# Patient Record
Sex: FEMALE | Race: BLACK OR AFRICAN AMERICAN | Employment: OTHER | URBAN - METROPOLITAN AREA
[De-identification: names, ages, dates, MRNs, and addresses within clinical notes are randomized per-mention and may not be internally consistent; named-entity substitution may affect disease eponyms.]

---

## 2017-05-01 ENCOUNTER — OFFICE VISIT (OUTPATIENT)
Dept: FAMILY MEDICINE CLINIC | Age: 82
End: 2017-05-01

## 2017-05-01 VITALS
HEIGHT: 64 IN | TEMPERATURE: 97.3 F | DIASTOLIC BLOOD PRESSURE: 84 MMHG | BODY MASS INDEX: 21.15 KG/M2 | SYSTOLIC BLOOD PRESSURE: 148 MMHG | OXYGEN SATURATION: 99 % | WEIGHT: 123.9 LBS | HEART RATE: 88 BPM | RESPIRATION RATE: 16 BRPM

## 2017-05-01 DIAGNOSIS — H91.91 HEARING LOSS, RIGHT: Primary | ICD-10-CM

## 2017-05-01 DIAGNOSIS — H61.21 EXCESSIVE EAR WAX, RIGHT: ICD-10-CM

## 2017-05-01 DIAGNOSIS — R03.0 HIGH BLOOD PRESSURE (NOT HYPERTENSION): ICD-10-CM

## 2017-05-01 PROBLEM — H61.20 EXCESSIVE EAR WAX: Status: ACTIVE | Noted: 2017-05-01

## 2017-05-01 PROCEDURE — 99213 OFFICE O/P EST LOW 20 MIN: CPT | Performed by: FAMILY MEDICINE

## 2017-05-01 RX ORDER — BRIMONIDINE TARTRATE, TIMOLOL MALEATE 2; 5 MG/ML; MG/ML
SOLUTION/ DROPS OPHTHALMIC EVERY 12 HOURS
COMMUNITY
End: 2017-05-30 | Stop reason: ALTCHOICE

## 2017-05-01 ASSESSMENT — ENCOUNTER SYMPTOMS
SWOLLEN GLANDS: 0
NAUSEA: 0
SORE THROAT: 0
COUGH: 0
VISUAL CHANGE: 0
CHANGE IN BOWEL HABIT: 0

## 2017-05-04 ENCOUNTER — OFFICE VISIT (OUTPATIENT)
Dept: ENT CLINIC | Age: 82
End: 2017-05-04

## 2017-05-04 VITALS
SYSTOLIC BLOOD PRESSURE: 146 MMHG | BODY MASS INDEX: 21 KG/M2 | WEIGHT: 123 LBS | DIASTOLIC BLOOD PRESSURE: 80 MMHG | HEIGHT: 64 IN

## 2017-05-04 DIAGNOSIS — H61.23 EXCESSIVE EAR WAX, BILATERAL: Primary | ICD-10-CM

## 2017-05-04 PROCEDURE — 99202 OFFICE O/P NEW SF 15 MIN: CPT | Performed by: OTOLARYNGOLOGY

## 2017-05-17 ENCOUNTER — TELEPHONE (OUTPATIENT)
Dept: FAMILY MEDICINE CLINIC | Age: 82
End: 2017-05-17

## 2017-05-30 ENCOUNTER — OFFICE VISIT (OUTPATIENT)
Dept: FAMILY MEDICINE CLINIC | Age: 82
End: 2017-05-30

## 2017-05-30 VITALS
WEIGHT: 123 LBS | BODY MASS INDEX: 21 KG/M2 | RESPIRATION RATE: 16 BRPM | DIASTOLIC BLOOD PRESSURE: 76 MMHG | SYSTOLIC BLOOD PRESSURE: 138 MMHG | OXYGEN SATURATION: 98 % | HEIGHT: 64 IN | TEMPERATURE: 97.8 F | HEART RATE: 101 BPM

## 2017-05-30 DIAGNOSIS — H40.9 GLAUCOMA OF LEFT EYE, UNSPECIFIED GLAUCOMA: ICD-10-CM

## 2017-05-30 DIAGNOSIS — R03.0 HIGH BLOOD PRESSURE (NOT HYPERTENSION): ICD-10-CM

## 2017-05-30 DIAGNOSIS — Z01.818 PREOP EXAMINATION: Primary | ICD-10-CM

## 2017-05-30 PROCEDURE — 99214 OFFICE O/P EST MOD 30 MIN: CPT | Performed by: FAMILY MEDICINE

## 2017-05-30 ASSESSMENT — ENCOUNTER SYMPTOMS
DIARRHEA: 0
CHEST TIGHTNESS: 0
WHEEZING: 0
NAUSEA: 0
SHORTNESS OF BREATH: 0

## 2017-06-06 ENCOUNTER — TELEPHONE (OUTPATIENT)
Dept: FAMILY MEDICINE CLINIC | Age: 82
End: 2017-06-06

## 2017-07-10 ENCOUNTER — TELEPHONE (OUTPATIENT)
Dept: FAMILY MEDICINE CLINIC | Age: 82
End: 2017-07-10

## 2017-07-10 DIAGNOSIS — G30.9 ALZHEIMER'S DEMENTIA WITHOUT BEHAVIORAL DISTURBANCE, UNSPECIFIED TIMING OF DEMENTIA ONSET: Primary | ICD-10-CM

## 2017-07-10 DIAGNOSIS — F02.80 ALZHEIMER'S DEMENTIA WITHOUT BEHAVIORAL DISTURBANCE, UNSPECIFIED TIMING OF DEMENTIA ONSET: Primary | ICD-10-CM

## 2017-09-06 ENCOUNTER — TELEPHONE (OUTPATIENT)
Dept: FAMILY MEDICINE CLINIC | Age: 82
End: 2017-09-06

## 2017-10-05 ENCOUNTER — OFFICE VISIT (OUTPATIENT)
Dept: FAMILY MEDICINE CLINIC | Age: 82
End: 2017-10-05

## 2017-10-05 VITALS
TEMPERATURE: 97.9 F | SYSTOLIC BLOOD PRESSURE: 132 MMHG | DIASTOLIC BLOOD PRESSURE: 72 MMHG | WEIGHT: 124.7 LBS | OXYGEN SATURATION: 97 % | HEART RATE: 97 BPM | BODY MASS INDEX: 21.4 KG/M2 | RESPIRATION RATE: 16 BRPM

## 2017-10-05 DIAGNOSIS — R35.0 URINARY FREQUENCY: ICD-10-CM

## 2017-10-05 DIAGNOSIS — G31.09 FRONTO-TEMPORAL DEMENTIA (HCC): Primary | ICD-10-CM

## 2017-10-05 DIAGNOSIS — F02.80 FRONTO-TEMPORAL DEMENTIA (HCC): Primary | ICD-10-CM

## 2017-10-05 DIAGNOSIS — F41.8 DEPRESSION WITH ANXIETY: ICD-10-CM

## 2017-10-05 LAB
BILIRUBIN, POC: ABNORMAL
BLOOD URINE, POC: NEGATIVE
CLARITY, POC: CLEAR
COLOR, POC: YELLOW
GLUCOSE URINE, POC: ABNORMAL
KETONES, POC: NEGATIVE
LEUKOCYTE EST, POC: ABNORMAL
NITRITE, POC: NEGATIVE
PH, POC: 7.5
PROTEIN, POC: NEGATIVE
SPECIFIC GRAVITY, POC: 1.01
UROBILINOGEN, POC: 0.2

## 2017-10-05 PROCEDURE — 99214 OFFICE O/P EST MOD 30 MIN: CPT | Performed by: FAMILY MEDICINE

## 2017-10-05 PROCEDURE — 81002 URINALYSIS NONAUTO W/O SCOPE: CPT | Performed by: FAMILY MEDICINE

## 2017-10-05 RX ORDER — PAROXETINE 10 MG/1
10 TABLET, FILM COATED ORAL EVERY MORNING
Qty: 30 TABLET | Refills: 3 | Status: ON HOLD | OUTPATIENT
Start: 2017-10-05 | End: 2019-04-06 | Stop reason: HOSPADM

## 2017-10-05 ASSESSMENT — ENCOUNTER SYMPTOMS
CHANGE IN BOWEL HABIT: 0
VOMITING: 0
ABDOMINAL PAIN: 0
COUGH: 0
SORE THROAT: 0
VISUAL CHANGE: 0
NAUSEA: 0
SWOLLEN GLANDS: 0

## 2017-10-05 NOTE — PROGRESS NOTES
Subjective:      Patient ID: Ryan Kaur is a 80 y.o. female. Other   This is a new (dx with dementia\") problem. The current episode started more than 1 year ago. The problem occurs constantly. The problem has been gradually worsening. Associated symptoms include fatigue, urinary symptoms (frequency \"uses panty liners\") and weakness. Pertinent negatives include no abdominal pain, anorexia, arthralgias, change in bowel habit, chest pain, chills, congestion, coughing, fever, headaches, joint swelling, myalgias, nausea, neck pain, numbness, rash, sore throat, swollen glands, vertigo, visual change or vomiting. Nothing aggravates the symptoms. She has tried nothing for the symptoms. Mental Health Problem   The primary symptoms include dysphoric mood, negative symptoms and somatic symptoms. The primary symptoms do not include delusions, hallucinations, bizarre behavior or disorganized speech. Primary symptoms comment: anxious/depressed, son is an alcoholic \"always worried for him\". The current episode started more than 1 month ago. This is a chronic problem. The dysphoric mood began more than 2 weeks ago. The mood has been worsening since its onset. She characterizes the problem as severe. The mood includes feelings of sadness. Somatic symptoms include fatigue. Somatic symptoms do not include headaches, abdominal pain or myalgias. The degree of incapacity that she is experiencing as a consequence of her illness is moderate. Additional symptoms of the illness include anhedonia, insomnia, appetite change, fatigue, psychomotor retardation, attention impairment and poor judgment.  Additional symptoms of the illness do not include no hypersomnia, no unexpected weight change, no agitation, no feelings of worthlessness, no euphoric mood, no increased goal-directed activity, no flight of ideas, no inflated self-esteem, no decreased need for sleep, not distractible, no visual change, no headaches or no abdominal pain. She does not admit to suicidal ideas. She does not have a plan to commit suicide. She does not contemplate harming herself. She has not already injured self. She does not contemplate injuring another person. She has not already  injured another person. Risk factors that are present for mental illness include a history of mental illness. Review of Systems   Constitutional: Positive for appetite change and fatigue. Negative for chills, fever and unexpected weight change. HENT: Negative for congestion and sore throat. Respiratory: Negative for cough. Cardiovascular: Negative for chest pain. Gastrointestinal: Negative for abdominal pain, anorexia, change in bowel habit, nausea and vomiting. Musculoskeletal: Negative for arthralgias, joint swelling, myalgias and neck pain. Skin: Negative for rash. Neurological: Positive for weakness. Negative for vertigo, numbness and headaches. Psychiatric/Behavioral: Positive for dysphoric mood. Negative for agitation and hallucinations. The patient has insomnia. has No Known Allergies. Current Outpatient Prescriptions:     PARoxetine (PAXIL) 10 MG tablet, Take 1 tablet by mouth every morning, Disp: 30 tablet, Rfl: 3    latanoprost (XALATAN) 0.005 % ophthalmic solution, 1 drop nightly, Disp: , Rfl:     vitamin B-12 (CYANOCOBALAMIN) 1000 MCG tablet, Take 1,000 mcg by mouth daily, Disp: , Rfl:     ferrous sulfate 325 (65 FE) MG tablet, Take 325 mg by mouth daily (with breakfast), Disp: , Rfl:     Carboxymethylcellul-Glycerin (OPTIVE OP), Apply to eye, Disp: , Rfl:     desloratadine (CLARINEX) 5 MG tablet, One po qd, Disp: 30 tablet, Rfl: 0    Multiple Minerals-Vitamins (CITRACAL PLUS PO), Take 1 tablet by mouth 2 times daily, Disp: , Rfl:     Multiple Vitamins-Minerals (MULTIVITAL) TABS, Take 1 tablet by mouth 2 times daily, Disp: , Rfl:      has a past medical history of Allergic rhinitis; Anemia;  Anxious mood; Depression; and Glaucoma. Past Surgical History:   Procedure Laterality Date    CATARACT REMOVAL  12/14    Glaucoma surgery as well    CATARACT REMOVAL WITH IMPLANT  4/2015    Glaucoma surgery as well    COLONOSCOPY      Unknown date    NOSE SURGERY          reports that she has never smoked. She has never used smokeless tobacco. She reports that she does not drink alcohol or use illicit drugs. family history includes Breast Cancer in her sister; Cancer in her brother; Dementia in her mother. Objective:   Physical Exam   Constitutional: She is oriented to person, place, and time. She appears well-developed and well-nourished. No distress. HENT:   Head: Normocephalic. Eyes: Conjunctivae and EOM are normal. Pupils are equal, round, and reactive to light. No scleral icterus. Neck: Normal range of motion. Cardiovascular: Normal rate and regular rhythm. No murmur heard. Pulmonary/Chest: Effort normal and breath sounds normal. No respiratory distress. She has no wheezes. Musculoskeletal: Normal range of motion. Neurological: She is alert and oriented to person, place, and time. No cranial nerve deficit. Coordination normal.   Skin: Skin is warm. She is not diaphoretic. No erythema. Psychiatric: Her mood appears anxious. Her affect is not angry, not blunt, not labile and not inappropriate. Her speech is tangential. She is slowed. She is not agitated, not aggressive, not withdrawn and not actively hallucinating. Thought content is paranoid (thinks that eye drops given by ophthalmology \"caused all my problems\", ). Thought content is not delusional. She does not express impulsivity or inappropriate judgment. She exhibits a depressed mood. She expresses no homicidal and no suicidal ideation. She expresses no suicidal plans and no homicidal plans. She exhibits normal recent memory. She is attentive. Nursing note and vitals reviewed. Assessment:      1. Fronto-temporal dementia     2.  Depression

## 2017-10-07 LAB — URINE CULTURE, ROUTINE: NORMAL

## 2017-12-15 ENCOUNTER — TELEPHONE (OUTPATIENT)
Dept: FAMILY MEDICINE CLINIC | Age: 82
End: 2017-12-15

## 2017-12-15 NOTE — TELEPHONE ENCOUNTER
There is no serious interactions with eye drops  Continue same medications no changes needed at this time

## 2017-12-15 NOTE — TELEPHONE ENCOUNTER
[phone]   Liza Cunha    PT getting a tingling on her shoulder, sometimes on her back. Thinks it could be interaction between her meds from here with medication she got from her opthamogologist.  She thin\ks that medication from him is xalatan-latanoprost.    PT got this idea from watching television commercials. PT last seen 10/5/2017    Please advise patient.

## 2018-01-15 ENCOUNTER — OFFICE VISIT (OUTPATIENT)
Dept: FAMILY MEDICINE CLINIC | Age: 83
End: 2018-01-15

## 2018-01-15 VITALS
SYSTOLIC BLOOD PRESSURE: 136 MMHG | OXYGEN SATURATION: 97 % | DIASTOLIC BLOOD PRESSURE: 76 MMHG | RESPIRATION RATE: 16 BRPM | TEMPERATURE: 97.5 F | HEART RATE: 73 BPM | BODY MASS INDEX: 20.49 KG/M2 | HEIGHT: 64 IN | WEIGHT: 120 LBS

## 2018-01-15 DIAGNOSIS — F02.80 FRONTAL DEMENTIA (HCC): Primary | ICD-10-CM

## 2018-01-15 DIAGNOSIS — G31.09 FRONTAL DEMENTIA (HCC): Primary | ICD-10-CM

## 2018-01-15 DIAGNOSIS — F41.8 DEPRESSION WITH ANXIETY: ICD-10-CM

## 2018-01-15 PROCEDURE — 99214 OFFICE O/P EST MOD 30 MIN: CPT | Performed by: FAMILY MEDICINE

## 2018-01-15 ASSESSMENT — ENCOUNTER SYMPTOMS: VISUAL CHANGE: 0

## 2018-01-15 NOTE — PROGRESS NOTES
well-nourished. No distress. HENT:   Head: Normocephalic and atraumatic. Eyes: Conjunctivae and EOM are normal. Pupils are equal, round, and reactive to light. Neck: Normal range of motion. Neck supple. No thyromegaly present. Cardiovascular: Normal rate, regular rhythm, normal heart sounds and intact distal pulses. Exam reveals no gallop and no friction rub. No murmur heard. Musculoskeletal: She exhibits no edema. Neurological: She is alert and oriented to person, place, and time. She displays normal reflexes. No cranial nerve deficit. She exhibits normal muscle tone. Coordination normal.   Skin: Skin is warm. She is not diaphoretic. No erythema. Psychiatric: Judgment and thought content normal. Her mood appears not anxious. Her affect is not angry, not blunt, not labile and not inappropriate. Her speech is tangential. Her speech is not delayed. She is slowed. She is not agitated. Thought content is not paranoid and not delusional. Cognition and memory are impaired. She does not express impulsivity or inappropriate judgment. She does not exhibit a depressed mood. She expresses no homicidal and no suicidal ideation. She expresses no suicidal plans and no homicidal plans. Nursing note and vitals reviewed. Assessment:      1. Frontal dementia     2. Depression with anxiety             Plan:      1. Unchanged  Life style and safety discussed  There is stressors in the family, her daughter who is the primary care is under a lot of stress, we discuss the option of going to a NH but pt is not ready for this, his safety seems to be an issue, her daughter thinks that she is not safe at home. Her daughter left the room very mad with her mother.      We will keep an eye on this, but agree with daughter that Mrs Dayanna Miranda may need to go to NH    She agreed to take paxil as rx      Time face to face >25 minutes, 50% time spent in education and coordination of care  Topics discussed:   Medical condition,

## 2018-02-27 ENCOUNTER — TELEPHONE (OUTPATIENT)
Dept: FAMILY MEDICINE CLINIC | Age: 83
End: 2018-02-27

## 2018-03-02 ENCOUNTER — TELEPHONE (OUTPATIENT)
Dept: FAMILY MEDICINE CLINIC | Age: 83
End: 2018-03-02

## 2018-03-02 NOTE — TELEPHONE ENCOUNTER
Pt daughter is calling they are going to be looking in to a memory loss treatment center. They have 3 they are going to try and look at today. She said that her mom was not very happy.      Daughter said if you have any questions to please call her    Please advise   [phone]

## 2018-03-05 ENCOUNTER — TELEPHONE (OUTPATIENT)
Dept: FAMILY MEDICINE CLINIC | Age: 83
End: 2018-03-05

## 2018-03-26 ENCOUNTER — TELEPHONE (OUTPATIENT)
Dept: FAMILY MEDICINE CLINIC | Age: 83
End: 2018-03-26

## 2018-04-03 ENCOUNTER — TELEPHONE (OUTPATIENT)
Dept: FAMILY MEDICINE CLINIC | Age: 83
End: 2018-04-03

## 2018-04-16 ENCOUNTER — OFFICE VISIT (OUTPATIENT)
Dept: FAMILY MEDICINE CLINIC | Age: 83
End: 2018-04-16

## 2018-04-16 VITALS
TEMPERATURE: 97.5 F | BODY MASS INDEX: 20.83 KG/M2 | OXYGEN SATURATION: 98 % | HEART RATE: 82 BPM | DIASTOLIC BLOOD PRESSURE: 72 MMHG | HEIGHT: 64 IN | RESPIRATION RATE: 16 BRPM | WEIGHT: 122 LBS | SYSTOLIC BLOOD PRESSURE: 140 MMHG

## 2018-04-16 DIAGNOSIS — F02.80 FRONTAL DEMENTIA (HCC): ICD-10-CM

## 2018-04-16 DIAGNOSIS — G31.09 FRONTAL DEMENTIA (HCC): ICD-10-CM

## 2018-04-16 DIAGNOSIS — I10 ESSENTIAL HYPERTENSION: Primary | ICD-10-CM

## 2018-04-16 PROCEDURE — 99214 OFFICE O/P EST MOD 30 MIN: CPT | Performed by: FAMILY MEDICINE

## 2018-04-16 ASSESSMENT — ENCOUNTER SYMPTOMS
ORTHOPNEA: 0
SHORTNESS OF BREATH: 0
BLURRED VISION: 0

## 2018-05-07 ENCOUNTER — TELEPHONE (OUTPATIENT)
Dept: FAMILY MEDICINE CLINIC | Age: 83
End: 2018-05-07

## 2018-05-16 ENCOUNTER — OFFICE VISIT (OUTPATIENT)
Dept: FAMILY MEDICINE CLINIC | Age: 83
End: 2018-05-16

## 2018-05-16 VITALS
TEMPERATURE: 97.8 F | RESPIRATION RATE: 16 BRPM | WEIGHT: 120 LBS | OXYGEN SATURATION: 98 % | DIASTOLIC BLOOD PRESSURE: 82 MMHG | BODY MASS INDEX: 20.49 KG/M2 | SYSTOLIC BLOOD PRESSURE: 130 MMHG | HEIGHT: 64 IN | HEART RATE: 77 BPM

## 2018-05-16 DIAGNOSIS — Z00.00 MEDICARE ANNUAL WELLNESS VISIT, SUBSEQUENT: Primary | ICD-10-CM

## 2018-05-16 LAB
ANION GAP SERPL CALCULATED.3IONS-SCNC: 13 MMOL/L (ref 3–16)
BUN BLDV-MCNC: 19 MG/DL (ref 7–20)
CALCIUM SERPL-MCNC: 10.2 MG/DL (ref 8.3–10.6)
CHLORIDE BLD-SCNC: 107 MMOL/L (ref 99–110)
CHOLESTEROL, TOTAL: 181 MG/DL (ref 0–199)
CO2: 26 MMOL/L (ref 21–32)
CREAT SERPL-MCNC: 0.8 MG/DL (ref 0.6–1.2)
GFR AFRICAN AMERICAN: >60
GFR NON-AFRICAN AMERICAN: >60
GLUCOSE BLD-MCNC: 91 MG/DL (ref 70–99)
HDLC SERPL-MCNC: 62 MG/DL (ref 40–60)
LDL CHOLESTEROL CALCULATED: 107 MG/DL
POTASSIUM SERPL-SCNC: 4.5 MMOL/L (ref 3.5–5.1)
SODIUM BLD-SCNC: 146 MMOL/L (ref 136–145)
TRIGL SERPL-MCNC: 62 MG/DL (ref 0–150)
TSH SERPL DL<=0.05 MIU/L-ACNC: 3.14 UIU/ML (ref 0.27–4.2)
VLDLC SERPL CALC-MCNC: 12 MG/DL

## 2018-05-16 PROCEDURE — G0439 PPPS, SUBSEQ VISIT: HCPCS | Performed by: FAMILY MEDICINE

## 2018-05-16 ASSESSMENT — PATIENT HEALTH QUESTIONNAIRE - PHQ9
2. FEELING DOWN, DEPRESSED OR HOPELESS: 0
SUM OF ALL RESPONSES TO PHQ QUESTIONS 1-9: 0
1. LITTLE INTEREST OR PLEASURE IN DOING THINGS: 0
SUM OF ALL RESPONSES TO PHQ9 QUESTIONS 1 & 2: 0

## 2018-09-18 ENCOUNTER — TELEPHONE (OUTPATIENT)
Dept: FAMILY MEDICINE CLINIC | Age: 83
End: 2018-09-18

## 2018-12-27 ENCOUNTER — TELEPHONE (OUTPATIENT)
Dept: FAMILY MEDICINE CLINIC | Age: 83
End: 2018-12-27

## 2019-03-20 ENCOUNTER — HOSPITAL ENCOUNTER (EMERGENCY)
Age: 84
Discharge: HOME | End: 2019-03-21
Attending: EMERGENCY MEDICINE
Payer: MEDICARE

## 2019-03-20 ENCOUNTER — APPOINTMENT (OUTPATIENT)
Dept: GENERAL RADIOLOGY | Age: 84
End: 2019-03-20
Payer: MEDICARE

## 2019-03-20 DIAGNOSIS — R07.81 RIB PAIN: ICD-10-CM

## 2019-03-20 DIAGNOSIS — F03.90 DEMENTIA WITHOUT BEHAVIORAL DISTURBANCE, UNSPECIFIED DEMENTIA TYPE: ICD-10-CM

## 2019-03-20 DIAGNOSIS — E87.8 ELECTROLYTE IMBALANCE: Primary | ICD-10-CM

## 2019-03-20 LAB
A/G RATIO: 1.1 (ref 1.1–2.2)
ALBUMIN SERPL-MCNC: 2.8 G/DL (ref 3.4–5)
ALP BLD-CCNC: 50 U/L (ref 40–129)
ALT SERPL-CCNC: 9 U/L (ref 10–40)
ANION GAP SERPL CALCULATED.3IONS-SCNC: 8 MMOL/L (ref 3–16)
AST SERPL-CCNC: 15 U/L (ref 15–37)
BASOPHILS ABSOLUTE: 0.1 K/UL (ref 0–0.2)
BASOPHILS RELATIVE PERCENT: 1.3 %
BILIRUB SERPL-MCNC: 0.4 MG/DL (ref 0–1)
BILIRUBIN URINE: NEGATIVE
BLOOD, URINE: NEGATIVE
BUN BLDV-MCNC: 10 MG/DL (ref 7–20)
CALCIUM SERPL-MCNC: 7.5 MG/DL (ref 8.3–10.6)
CHLORIDE BLD-SCNC: 113 MMOL/L (ref 99–110)
CLARITY: CLEAR
CO2: 22 MMOL/L (ref 21–32)
COLOR: YELLOW
CREAT SERPL-MCNC: <0.5 MG/DL (ref 0.6–1.2)
EOSINOPHILS ABSOLUTE: 0.1 K/UL (ref 0–0.6)
EOSINOPHILS RELATIVE PERCENT: 1.5 %
GFR AFRICAN AMERICAN: >60
GFR NON-AFRICAN AMERICAN: >60
GLOBULIN: 2.6 G/DL
GLUCOSE BLD-MCNC: 71 MG/DL (ref 70–99)
GLUCOSE URINE: NEGATIVE MG/DL
HCT VFR BLD CALC: 41.2 % (ref 36–48)
HEMOGLOBIN: 13.4 G/DL (ref 12–16)
KETONES, URINE: NEGATIVE MG/DL
LEUKOCYTE ESTERASE, URINE: NEGATIVE
LYMPHOCYTES ABSOLUTE: 1.4 K/UL (ref 1–5.1)
LYMPHOCYTES RELATIVE PERCENT: 31.5 %
MAGNESIUM: 1.6 MG/DL (ref 1.8–2.4)
MCH RBC QN AUTO: 31.1 PG (ref 26–34)
MCHC RBC AUTO-ENTMCNC: 32.4 G/DL (ref 31–36)
MCV RBC AUTO: 95.8 FL (ref 80–100)
MICROSCOPIC EXAMINATION: NORMAL
MONOCYTES ABSOLUTE: 0.6 K/UL (ref 0–1.3)
MONOCYTES RELATIVE PERCENT: 14.1 %
NEUTROPHILS ABSOLUTE: 2.3 K/UL (ref 1.7–7.7)
NEUTROPHILS RELATIVE PERCENT: 51.6 %
NITRITE, URINE: NEGATIVE
PDW BLD-RTO: 13.6 % (ref 12.4–15.4)
PH UA: 8 (ref 5–8)
PLATELET # BLD: 158 K/UL (ref 135–450)
PMV BLD AUTO: 10 FL (ref 5–10.5)
POTASSIUM REFLEX MAGNESIUM: 3.1 MMOL/L (ref 3.5–5.1)
PROTEIN UA: NEGATIVE MG/DL
RBC # BLD: 4.3 M/UL (ref 4–5.2)
REASON FOR REJECTION: NORMAL
REASON FOR REJECTION: NORMAL
REJECTED TEST: NORMAL
REJECTED TEST: NORMAL
SODIUM BLD-SCNC: 143 MMOL/L (ref 136–145)
SPECIFIC GRAVITY UA: 1.01 (ref 1–1.03)
TOTAL PROTEIN: 5.4 G/DL (ref 6.4–8.2)
TROPONIN: <0.01 NG/ML
URINE REFLEX TO CULTURE: NORMAL
URINE TYPE: NORMAL
UROBILINOGEN, URINE: 0.2 E.U./DL
WBC # BLD: 4.4 K/UL (ref 4–11)

## 2019-03-20 PROCEDURE — 83735 ASSAY OF MAGNESIUM: CPT

## 2019-03-20 PROCEDURE — 99284 EMERGENCY DEPT VISIT MOD MDM: CPT

## 2019-03-20 PROCEDURE — 85025 COMPLETE CBC W/AUTO DIFF WBC: CPT

## 2019-03-20 PROCEDURE — 71045 X-RAY EXAM CHEST 1 VIEW: CPT

## 2019-03-20 PROCEDURE — 84484 ASSAY OF TROPONIN QUANT: CPT

## 2019-03-20 PROCEDURE — 81003 URINALYSIS AUTO W/O SCOPE: CPT

## 2019-03-20 PROCEDURE — 80053 COMPREHEN METABOLIC PANEL: CPT

## 2019-03-20 RX ORDER — POTASSIUM CHLORIDE 750 MG/1
40 TABLET, FILM COATED, EXTENDED RELEASE ORAL ONCE
Status: COMPLETED | OUTPATIENT
Start: 2019-03-20 | End: 2019-03-21

## 2019-03-20 RX ORDER — POTASSIUM CHLORIDE 20 MEQ/1
20 TABLET, EXTENDED RELEASE ORAL DAILY
Qty: 3 TABLET | Refills: 0 | Status: ON HOLD | OUTPATIENT
Start: 2019-03-20 | End: 2019-04-06 | Stop reason: HOSPADM

## 2019-03-20 RX ORDER — ADHESIVE TAPE 3"X 2.3 YD
200 TAPE, NON-MEDICATED TOPICAL DAILY
Qty: 3 TABLET | Refills: 0 | Status: ON HOLD | OUTPATIENT
Start: 2019-03-20 | End: 2019-04-06 | Stop reason: HOSPADM

## 2019-03-21 VITALS
TEMPERATURE: 98.2 F | RESPIRATION RATE: 16 BRPM | SYSTOLIC BLOOD PRESSURE: 135 MMHG | HEIGHT: 64 IN | WEIGHT: 120 LBS | HEART RATE: 75 BPM | OXYGEN SATURATION: 100 % | DIASTOLIC BLOOD PRESSURE: 69 MMHG | BODY MASS INDEX: 20.49 KG/M2

## 2019-03-21 PROCEDURE — 6370000000 HC RX 637 (ALT 250 FOR IP): Performed by: EMERGENCY MEDICINE

## 2019-03-21 RX ADMIN — Medication 600 MG: at 06:50

## 2019-03-21 RX ADMIN — Medication 400 MG: at 06:51

## 2019-03-21 RX ADMIN — POTASSIUM CHLORIDE 40 MEQ: 750 TABLET, FILM COATED, EXTENDED RELEASE ORAL at 06:50

## 2019-03-29 ENCOUNTER — HOSPITAL ENCOUNTER (EMERGENCY)
Age: 84
Discharge: ANOTHER HOSPITAL | End: 2019-03-30
Attending: EMERGENCY MEDICINE
Payer: MEDICARE

## 2019-03-29 ENCOUNTER — APPOINTMENT (OUTPATIENT)
Dept: GENERAL RADIOLOGY | Age: 84
End: 2019-03-29
Payer: MEDICARE

## 2019-03-29 ENCOUNTER — APPOINTMENT (OUTPATIENT)
Dept: CT IMAGING | Age: 84
End: 2019-03-29
Payer: MEDICARE

## 2019-03-29 DIAGNOSIS — F01.52: Primary | ICD-10-CM

## 2019-03-29 PROBLEM — F29 PSYCHOSIS (HCC): Status: ACTIVE | Noted: 2019-03-29

## 2019-03-29 LAB
A/G RATIO: 1.2 (ref 1.1–2.2)
ALBUMIN SERPL-MCNC: 3.8 G/DL (ref 3.4–5)
ALP BLD-CCNC: 65 U/L (ref 40–129)
ALT SERPL-CCNC: 13 U/L (ref 10–40)
ANION GAP SERPL CALCULATED.3IONS-SCNC: 11 MMOL/L (ref 3–16)
AST SERPL-CCNC: 19 U/L (ref 15–37)
BASOPHILS ABSOLUTE: 0 K/UL (ref 0–0.2)
BASOPHILS RELATIVE PERCENT: 0.9 %
BILIRUB SERPL-MCNC: 0.4 MG/DL (ref 0–1)
BUN BLDV-MCNC: 18 MG/DL (ref 7–20)
CALCIUM SERPL-MCNC: 9.8 MG/DL (ref 8.3–10.6)
CHLORIDE BLD-SCNC: 104 MMOL/L (ref 99–110)
CO2: 26 MMOL/L (ref 21–32)
CREAT SERPL-MCNC: 0.7 MG/DL (ref 0.6–1.2)
EOSINOPHILS ABSOLUTE: 0.1 K/UL (ref 0–0.6)
EOSINOPHILS RELATIVE PERCENT: 1.1 %
GFR AFRICAN AMERICAN: >60
GFR NON-AFRICAN AMERICAN: >60
GLOBULIN: 3.2 G/DL
GLUCOSE BLD-MCNC: 111 MG/DL (ref 70–99)
HCT VFR BLD CALC: 37.1 % (ref 36–48)
HEMOGLOBIN: 11.9 G/DL (ref 12–16)
LYMPHOCYTES ABSOLUTE: 1.5 K/UL (ref 1–5.1)
LYMPHOCYTES RELATIVE PERCENT: 33.5 %
MAGNESIUM: 2.1 MG/DL (ref 1.8–2.4)
MCH RBC QN AUTO: 30.9 PG (ref 26–34)
MCHC RBC AUTO-ENTMCNC: 32.1 G/DL (ref 31–36)
MCV RBC AUTO: 96.4 FL (ref 80–100)
MONOCYTES ABSOLUTE: 0.5 K/UL (ref 0–1.3)
MONOCYTES RELATIVE PERCENT: 11.3 %
NEUTROPHILS ABSOLUTE: 2.5 K/UL (ref 1.7–7.7)
NEUTROPHILS RELATIVE PERCENT: 53.2 %
PDW BLD-RTO: 13.6 % (ref 12.4–15.4)
PLATELET # BLD: 149 K/UL (ref 135–450)
PMV BLD AUTO: 9.2 FL (ref 5–10.5)
POTASSIUM SERPL-SCNC: 4.1 MMOL/L (ref 3.5–5.1)
RBC # BLD: 3.85 M/UL (ref 4–5.2)
SODIUM BLD-SCNC: 141 MMOL/L (ref 136–145)
TOTAL PROTEIN: 7 G/DL (ref 6.4–8.2)
TROPONIN: <0.01 NG/ML
WBC # BLD: 4.6 K/UL (ref 4–11)

## 2019-03-29 PROCEDURE — 80307 DRUG TEST PRSMV CHEM ANLYZR: CPT

## 2019-03-29 PROCEDURE — 81003 URINALYSIS AUTO W/O SCOPE: CPT

## 2019-03-29 PROCEDURE — 83735 ASSAY OF MAGNESIUM: CPT

## 2019-03-29 PROCEDURE — 85025 COMPLETE CBC W/AUTO DIFF WBC: CPT

## 2019-03-29 PROCEDURE — 84484 ASSAY OF TROPONIN QUANT: CPT

## 2019-03-29 PROCEDURE — 70450 CT HEAD/BRAIN W/O DYE: CPT

## 2019-03-29 PROCEDURE — 71046 X-RAY EXAM CHEST 2 VIEWS: CPT

## 2019-03-29 PROCEDURE — 99284 EMERGENCY DEPT VISIT MOD MDM: CPT

## 2019-03-29 PROCEDURE — 80053 COMPREHEN METABOLIC PANEL: CPT

## 2019-03-29 PROCEDURE — 36415 COLL VENOUS BLD VENIPUNCTURE: CPT

## 2019-03-29 PROCEDURE — 93005 ELECTROCARDIOGRAM TRACING: CPT | Performed by: PHYSICIAN ASSISTANT

## 2019-03-29 ASSESSMENT — ENCOUNTER SYMPTOMS
COLOR CHANGE: 0
SHORTNESS OF BREATH: 0
ABDOMINAL DISTENTION: 0
COUGH: 0
VOMITING: 0
WHEEZING: 0
BACK PAIN: 1
STRIDOR: 0
CONSTIPATION: 0
ALLERGIC/IMMUNOLOGIC NEGATIVE: 1
NAUSEA: 0
DIARRHEA: 0
ABDOMINAL PAIN: 0

## 2019-03-30 ENCOUNTER — HOSPITAL ENCOUNTER (INPATIENT)
Age: 84
LOS: 11 days | Discharge: NURSING, REHAB OR LONG-TERM CARE | DRG: 884 | End: 2019-04-10
Attending: PSYCHIATRY & NEUROLOGY | Admitting: PSYCHIATRY & NEUROLOGY
Payer: MEDICARE

## 2019-03-30 VITALS
DIASTOLIC BLOOD PRESSURE: 77 MMHG | OXYGEN SATURATION: 100 % | RESPIRATION RATE: 16 BRPM | WEIGHT: 120 LBS | TEMPERATURE: 97.4 F | SYSTOLIC BLOOD PRESSURE: 151 MMHG | HEIGHT: 64 IN | HEART RATE: 88 BPM | BODY MASS INDEX: 20.49 KG/M2

## 2019-03-30 PROBLEM — D64.9 NORMOCYTIC ANEMIA: Status: ACTIVE | Noted: 2019-03-30

## 2019-03-30 PROBLEM — I10 ESSENTIAL HYPERTENSION: Status: ACTIVE | Noted: 2019-03-30

## 2019-03-30 LAB
AMPHETAMINE SCREEN, URINE: NORMAL
BARBITURATE SCREEN URINE: NORMAL
BENZODIAZEPINE SCREEN, URINE: NORMAL
BILIRUBIN URINE: NEGATIVE
BLOOD, URINE: NEGATIVE
CANNABINOID SCREEN URINE: NORMAL
CLARITY: CLEAR
COCAINE METABOLITE SCREEN URINE: NORMAL
COLOR: YELLOW
EKG ATRIAL RATE: 78 BPM
EKG DIAGNOSIS: NORMAL
EKG P AXIS: 80 DEGREES
EKG P-R INTERVAL: 136 MS
EKG Q-T INTERVAL: 366 MS
EKG QRS DURATION: 88 MS
EKG QTC CALCULATION (BAZETT): 417 MS
EKG R AXIS: 24 DEGREES
EKG T AXIS: 55 DEGREES
EKG VENTRICULAR RATE: 78 BPM
GLUCOSE URINE: NEGATIVE MG/DL
KETONES, URINE: NEGATIVE MG/DL
LEUKOCYTE ESTERASE, URINE: NEGATIVE
Lab: NORMAL
METHADONE SCREEN, URINE: NORMAL
MICROSCOPIC EXAMINATION: ABNORMAL
NITRITE, URINE: NEGATIVE
OPIATE SCREEN URINE: NORMAL
OXYCODONE URINE: NORMAL
PH UA: 7
PH UA: 8.5 (ref 5–8)
PHENCYCLIDINE SCREEN URINE: NORMAL
PROPOXYPHENE SCREEN: NORMAL
PROTEIN UA: NEGATIVE MG/DL
SPECIFIC GRAVITY UA: 1.01 (ref 1–1.03)
URINE REFLEX TO CULTURE: ABNORMAL
URINE TYPE: ABNORMAL
UROBILINOGEN, URINE: 0.2 E.U./DL

## 2019-03-30 PROCEDURE — 1240000000 HC EMOTIONAL WELLNESS R&B

## 2019-03-30 PROCEDURE — 93010 ELECTROCARDIOGRAM REPORT: CPT | Performed by: INTERNAL MEDICINE

## 2019-03-30 PROCEDURE — 99222 1ST HOSP IP/OBS MODERATE 55: CPT | Performed by: PHYSICIAN ASSISTANT

## 2019-03-30 PROCEDURE — 99223 1ST HOSP IP/OBS HIGH 75: CPT | Performed by: PSYCHIATRY & NEUROLOGY

## 2019-03-30 RX ORDER — ACETAMINOPHEN 325 MG/1
650 TABLET ORAL EVERY 4 HOURS PRN
Status: DISCONTINUED | OUTPATIENT
Start: 2019-03-30 | End: 2019-04-10 | Stop reason: HOSPADM

## 2019-03-30 RX ORDER — M-VIT,TX,IRON,MINS/CALC/FOLIC 27MG-0.4MG
1 TABLET ORAL DAILY
Status: DISCONTINUED | OUTPATIENT
Start: 2019-03-30 | End: 2019-04-03

## 2019-03-30 RX ORDER — CHOLECALCIFEROL (VITAMIN D3) 125 MCG
1000 CAPSULE ORAL DAILY
Status: DISCONTINUED | OUTPATIENT
Start: 2019-03-30 | End: 2019-04-03

## 2019-03-30 ASSESSMENT — SLEEP AND FATIGUE QUESTIONNAIRES
DO YOU USE A SLEEP AID: NO
DO YOU HAVE DIFFICULTY SLEEPING: NO
DO YOU HAVE DIFFICULTY SLEEPING: NO
AVERAGE NUMBER OF SLEEP HOURS: 8
DO YOU USE A SLEEP AID: NO
AVERAGE NUMBER OF SLEEP HOURS: 8

## 2019-03-30 ASSESSMENT — LIFESTYLE VARIABLES
HISTORY_ALCOHOL_USE: NO
HISTORY_ALCOHOL_USE: NO

## 2019-03-30 NOTE — ED PROVIDER NOTES
I have personally performed a face to face diagnostic evaluation on this patient. I have fully participated in the care of this patient. I have reviewed and agree with all pertinent clinical informationincluding history, physical exam, diagnostic tests, and the plan. History and Physical as follows:  HPI    Patient presents to the emergency room after refusing home health care paranoid of caregivers. Not cooperate with care for    Physical Exam    GENERAL: Patient appeared well-developed, well-nourished, vital signs reviewed. MENTAL STATUS: Patient is awake and alert   HEAD AND FACE :Head is normalcephalic. Face normal appearance  EYES: eyes lids and conjunctiva appear normal  ENT :ears and nose appear normal externally. CV: Heart regular rate and rhythm without murmur,  LUNGS: Lungs are clear to auscultation   ABDOMEN: Abdomen is soft to palpation. No tenderness to palpation. Bowel sounds are present in all 4 quadrants No masses palpable. No CV tenderness present. No Bruits present. PSYCHIATRIC:mood and affect paranoid and confused with flight of ideas    NEUROLOGIC: no weakness or numbness noted   This is a computerized dictation. I have made every effort to proofread this chart, however, transcription errors may exist.       Patient has dementia. She is paranoid refusing to cooperate with care.       Blas Mcelroy, DO  03/30/19 0597

## 2019-03-30 NOTE — PROGRESS NOTES
`[facility]  Admission Note     Admission Type:   Admission Type: Involuntary    Reason for admission:  Reason for Admission: Per [place] at [facility] ER department patient was brought in for complaints of dizzy, shaking, refusing meds and food, poor hygiene, recent falls. Patient has a guardian and reported that she did not trust him. PATIENT STRENGTHS:  Strengths: Other(\"Mormon/God is my support\")    Patient Strengths and Limitations:  Limitations: Difficult relationships / poor social skills    Addictive Behavior:   Addictive Behavior  In the past 3 months, have you felt or has someone told you that you have a problem with:  : None  Do you have a history of Chemical Use?: No  Do you have a history of Alcohol Use?: No  Do you have a history of Street Drug Abuse?: No  Histroy of Prescripton Drug Abuse?: No    Medical Problems:   Past Medical History:   Diagnosis Date    Allergic rhinitis 3/9/2016    Anemia     Anxious mood 3/9/2016    Depression 4/12/2011    Glaucoma        Status EXAM:  Status and Exam  Normal: No  Facial Expression: Worried  Affect: Congruent  Level of Consciousness: Alert  Mood:Normal: No  Mood: Depressed, Suspicious  Motor Activity:Normal: No  Motor Activity: Other(See Comments)(weakness of bilateral LE's noted, gait slightly unsteady)  Interview Behavior: Cooperative  Preception: [place] to Person, [place] to Time, [place] to Place(Patient knew day, month, year, president, and name of facility.  Patient is not sure why she was sent here)  Attention:Normal: No  Attention: Distractible  Thought Processes: Tangential, Loose Assoc., Other(See comment)(preoccupied with Caodaism thoughts)  Thought Content:Normal: No  Thought Content: Preoccupations, Paranoia, Delusions(patient stated that she was touched by \"God\" in the ER department and she seen all her loved ones singing hymms with her)  Hallucinations: Visual (Comment)(patient stated that she was touched by \"God\" in the ER department and she seen all her loved ones singing hymms with he)  Delusions: Yes  Delusions: Other(See Comment)(patient stated that she was touched by \"God\" in the ER department and she seen all her loved ones singing hymms with he)  Memory:Normal: No  Memory: Poor Recent, Poor Remote  Insight and Judgment: No  Insight and Judgment: Poor Judgment, Poor Insight    Tobacco Screening:  Practical Counseling, on admission, sabino X, if applicable and completed (first 3 are required if patient doesn't refuse):            ( )  Recognizing danger situations (included triggers and roadblocks)                    ( )  Coping skills (new ways to manage stress, exercise, relaxation techniques, changing routine, distraction)                                                           ( )  Basic information about quitting (benefits of quitting, techniques in how to quit, available resources  ( ) Referral for counseling faxed to [facility]                                           ( ) Patient refused counseling  ( ) Patient has not smoked in the last 30 days    Metabolic Screening:    No results found for: LABA1C    No results for input(s): CHOL, TRIG, HDL, LDLCALC, LABVLDL in the last 72 hours. Body mass index is 20.01 kg/m². BP Readings from Last 2 Encounters:   03/30/19 (!) 167/88   03/30/19 (!) 151/77           Pt admitted with followings belongings:  Dentures: None  Vision - Corrective Lenses: Glasses  Hearing Aid: None  Jewelry: None  Body Piercings Removed: N/A  Clothing: Shirt, Socks, Footwear  Were All Patient Medications Collected?: Yes     See note for belongings. Patient oriented to surroundings and program expectations and copy of patient rights given. Received admission packet: yes. Consents reviewed Refuse.  Patient verbalize understanding:    Patient education on precautions: yes                   Isabel Loza RN

## 2019-03-30 NOTE — BH NOTE
Tabitha Marvin, [phone] cell. Brought in documents. Copy made and placed in treatment binder. 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 during a psych eval in 2017. Guardian would like to see patient placed in a assisted living or a memory care unit. They have looked at [facility] or [facility]. Patient does not have a history of substance abuse. Patient has had home care services in the past, she was receptive in the beginning however, after a couple weeks, patient became paranoid, thinking caregivers were stealing from her. Services were discontinued then. Patient has been wandering around the neighborhood, going to neighbors home and called 911 to report a break in. (There was no evidence of a break in). Patient does not take medication as prescribed at home. Medications have not been filled since November 2018.

## 2019-03-30 NOTE — GROUP NOTE
Group Therapy Note    Date: March 30    Group Start Time: 1345  Group End Time: 1430  Group Topic: Psychoeducation    [address] [place] [address]    Zeina Gilbert      Group Therapy Note           Patient's Goal:  Patients were invited to participate in Leisure Group utilizing a recreational ball with conversation prompts printed on it. Patients were asked to throw the ball to one another, prompting group discussion. Patients were also encouraged to select music to listen to while participating in physical and social activity. Notes:  Desiree Beauchamp passed the balls to peers, requested music to listen to, and responded to conversational prompts. Status After Intervention:  Improved    Participation Level:  Active Listener and Interactive    Participation Quality: Appropriate, Attentive, Sharing and Supportive      Speech:  normal      Thought Process/Content: Flight of Ideas      Affective Functioning: Congruent      Mood: euthymic      Level of consciousness:  Alert and Attentive      Response to Learning: Able to verbalize current knowledge/experience, Able to verbalize/acknowledge new learning, Able to retain information and Capable of insight      Endings: None Reported    Modes of Intervention: Education, Support, Socialization and Exploration      Discipline Responsible: Psychoeducational Specialist      Signature:  Zeina [id number] Simon

## 2019-03-30 NOTE — ED NOTES
Pt straight cath performed for UA sample using sterile technique, pt tolerated well. UA sent to lab, PA updated.       Haja Silva RN  03/29/19 0080

## 2019-03-30 NOTE — PROGRESS NOTES
Arrived to unit from [facility] ER, admitted to room 2201-1. Patient refused to sign paperwork. Patient has a history of anxiety, depression, vascular dementia, anemia, and glaucoma. Patient has a guardian that she reports is \"trying to steal her money, I don't trust him\". Per the ER report patient has been refusing for home health care to come into her home to assist with her care, has not been eating, has recently fallen, poor hygiene. Patient alert and oriented to person, place, time, but disoriented to situation. Unable to state reason for admission to the unit, stated, \"I don't know why I was sent here. I had a vision and was touched by God in the ER\". Incontinent of urine upon arrival to the unit. Oriented to room and unit.

## 2019-03-30 NOTE — ED PROVIDER NOTES
[address] [facility]  eMERGENCY dEPARTMENT eNCOUnter        Pt Name: Suri Feldman  MRN: [id number]  Albertgfsnow 12/23/1931  Date of evaluation: 3/29/2019  Provider: Farooq Villalobos PA-C  PCP: Meghann Sood    This patient was seen and evaluated by the attending physician Dr. Mason Poole       Chief Complaint   Patient presents with    Dizziness     dizziness and shaky after eating tonight. denies chest pain, sob. states she had a fall last Friday after starting a new medication and is worried that the dizziness is related to that. HISTORY OF PRESENT ILLNESS   (Location/Symptom, Timing/Onset, Context/Setting, Quality, Duration, Modifying Factors, Severity)  Note limiting factors. Suri Feldman is a 80 y.o. female with history of vascular dementia, anxiety, depression, anemia, glaucoma who presents to the emergency department from home, where she lives by herself. A man named Mo Berg was granted guardianship by the [employer] a few years ago because family members were unable to care for her properly according to Mr. Janae Vaca. He is standing outside of the room. Patient does not want him inside of the room. She claims that he steals from her and does not feel safe going home. Denies any abuse. When I speak with Mr. Janae Vaca, he states that she has been refusing home health care and does not always eat or drink as she should. He is requesting that we admit this patient for that she can go into a memory care community. He feels that she is not safe to go home as she will not listen to reason and lives by herself. Aside from chronic back pain, patient denies any new pain. She reports some intermittent dizziness since starting a new medicine but cant recall the name. Denies persistent dizziness at this time. Nursing Notes were all reviewed and agreed with or any disagreements were addressed  in the HPI.     REVIEW OF SYSTEMS    (2-9 0.005 % OPHTHALMIC SOLUTION    1 drop nightly    MAGNESIUM OXIDE 200 MG TABS    Take 200 mg by mouth daily    MULTIPLE MINERALS-VITAMINS (CITRACAL PLUS PO)    Take 1 tablet by mouth 2 times daily    MULTIPLE VITAMINS-MINERALS (MULTIVITAL) TABS    Take 1 tablet by mouth 2 times daily    PAROXETINE (PAXIL) 10 MG TABLET    Take 1 tablet by mouth every morning    POTASSIUM CHLORIDE (KLOR-CON M) 20 MEQ EXTENDED RELEASE TABLET    Take 1 tablet by mouth daily for 5 days Take this medication when using Lasix. VITAMIN B-12 (CYANOCOBALAMIN) 1000 MCG TABLET    Take 1,000 mcg by mouth daily         ALLERGIES     Patient has no known allergies. FAMILYHISTORY       Family History   Problem Relation Age of Onset    Dementia Mother     Breast Cancer Sister     Cancer Brother         breast          SOCIAL HISTORY       Social History     Socioeconomic History    Marital status:       Spouse name: None    Number of children: 11    Years of education: 15    Highest education level: None   Occupational History    Occupation: retired   Social Needs    Financial resource strain: None    Food insecurity:     Worry: None     Inability: None    Transportation needs:     Medical: None     Non-medical: None   Tobacco Use    Smoking status: Never Smoker    Smokeless tobacco: Never Used   Substance and Sexual Activity    Alcohol use: No    Drug use: No    Sexual activity: Not Currently     Partners: Male   Lifestyle    Physical activity:     Days per week: None     Minutes per session: None    Stress: None   Relationships    Social connections:     Talks on phone: None     Gets together: None     Attends Confucianist service: None     Active member of club or organization: None     Attends meetings of clubs or organizations: None     Relationship status: None    Intimate partner violence:     Fear of current or ex partner: None     Emotionally abused: None     Physically abused: None     Forced sexual activity: None Other Topics Concern    None   Social History Narrative    None       SCREENINGS             PHYSICAL EXAM    (up to 7 for level 4, 8 or more for level 5)     ED Triage Vitals   BP Temp Temp Source Pulse Resp SpO2 Height Weight   03/29/19 2118 03/29/19 2114 03/29/19 2114 03/29/19 2114 03/29/19 2114 03/29/19 2114 03/29/19 2114 03/29/19 2114   (!) 162/75 98.8 °F (37.1 °C) Oral 86 15 100 % 5' 4\" (1.626 m) 120 lb (54.4 kg)       Physical Exam   Constitutional: Vital signs are normal. She appears well-developed. She appears cachectic. No distress. HENT:   Head: Normocephalic and atraumatic. Right Ear: External ear normal.   Left Ear: External ear normal.   Nose: Nose normal.   Mouth/Throat: Oropharynx is clear and moist.   Eyes: Pupils are equal, round, and reactive to light. Conjunctivae and EOM are normal. Right eye exhibits no discharge. Left eye exhibits no discharge. No scleral icterus. Neck: Normal range of motion. No JVD present. Cardiovascular: Normal rate and regular rhythm. Pulmonary/Chest: Effort normal and breath sounds normal.   Abdominal: Soft. Bowel sounds are normal. She exhibits no distension. There is no tenderness. Musculoskeletal: Normal range of motion. Lymphadenopathy:     She has no cervical adenopathy. Neurological: She is alert. She displays normal reflexes. No cranial nerve deficit (II-XII intact) or sensory deficit. Oriented to person not to place or time  No pronator drift, facial droop or slurred speech. Normal finger to nose coordination. Normal rapid alternating hand movement. Normal heel to shin coordination   modified  Griselda Hallpike negative without nystagmus. 5 out of 5 strength in all 4 extremities without focal weakness, paresthesia or radiculopathy. Skin: Skin is warm and dry. Capillary refill takes less than 2 seconds. No rash noted. She is not diaphoretic. No erythema. No pallor.    Psychiatric: Her speech is normal and behavior is normal. Judgment normal. Her mood appears anxious. She is not actively hallucinating. Thought content is paranoid. Cognition and memory are impaired. Patient is hard to communicate with and has flight of ideas She is attentive. Nursing note and vitals reviewed. DIAGNOSTIC RESULTS   LABS:    Labs Reviewed   CBC WITH AUTO DIFFERENTIAL - Abnormal; Notable for the following components:       Result Value    RBC 3.85 (*)     Hemoglobin 11.9 (*)     All other components within normal limits    Narrative:     Performed at:  [facility]  [employer]   Phone [phone]   COMPREHENSIVE METABOLIC PANEL - Abnormal; Notable for the following components:    Glucose 111 (*)     All other components within normal limits    Narrative:     Performed at:  [facility]  [employer]   Phone [phone]   TROPONIN    Narrative:     Performed at:  [facility]  [employer]   Phone [phone]   MAGNESIUM    Narrative:     Performed at:  [facility]  [employer]   Phone [phone]   URINE DRUG SCREEN    Narrative:     Performed at:  [facility]  [employer]   Phone [phone]   URINE RT REFLEX TO CULTURE       All other labs were within normal range or not returned as of this dictation. EKG: All EKG's are interpreted by the Emergency Department Physician who either signs orCo-signs this chart in the absence of a cardiologist.  Please see their note for interpretation of EKG.       RADIOLOGY:   Non-plain film images such as CT, Ultrasound and MRI are read by the radiologist. Plain radiographic images are visualized andpreliminarily interpreted by the  ED Provider with the below findings:        Interpretation perthe Radiologist below, if available at the time of this note:    CT HEAD WO CONTRAST   Final Result   1. No acute intracranial abnormality. 2. Mild white matter hypoattenuation, likely the sequela of chronic small   vessel ischemia. If there is persistent clinical concern for acute ischemia,   MRI would be the more sensitive modality. XR CHEST STANDARD (2 VW)   Preliminary Result   No acute cardiopulmonary disease. No results found. PROCEDURES   Unless otherwise noted below, none     Procedures    CRITICAL CARE TIME   Critical Care  There was a high probability of life-threatening clinical deterioration in the patient's condition requiring my urgent intervention. Total critical care time with the patient was 32 minutes excluding separately reportable procedures. Critical care required due to patients paranoid behavior and concern that she will not be able to take care of herself because she lives at home and is not being reasonable with her guardian or medical recommendations. This prompted psych evaluation and transfer to psych facility. CONSULTS:  [facility] accepted patient for transfer to their facility. EMERGENCY DEPARTMENT COURSE and DIFFERENTIALDIAGNOSIS/MDM:   Vitals:    Vitals:    03/29/19 2114 03/29/19 2118   BP:  (!) 162/75   Pulse: 86    Resp: 15    Temp: 98.8 °F (37.1 °C)    TempSrc: Oral    SpO2: 100%    Weight: 120 lb (54.4 kg)    Height: 5' 4\" (1.626 m)        Patient was given thefollowing medications:  Medications - No data to display    This patient was unsteady emergency department with transient dizziness but primarily is here because she is paranoid about her guardian stealing from her. Guardian is requesting psychiatric evaluation and does not feel that this patient is safe to go home and I agree. Patient is alert but not oriented aside from being oriented to person. Urinalysis does not suggest infection. CT head stable. Urine drug screen negative. EKG stable.   Vitals have remained stable throughout stay here and [facility] has accepted the patient for a geriatric psych evaluation. Patient will be transferred to their facility. Both patient and guardian agree with plan. My suspicion is low for ACS, PE, myocarditis, pericarditis, endocarditis, acute pulmonary edema, pleural effusion, pericardial effusion, cardiac tamponade, cardiomyopathy, CHF exacerbation, thoracic aortic dissection, esophageal rupture, other life-threatening arrhythmia, hypertensive urgency or emergency, hemothorax, pulmonary contusion, subcutaneous emphysema, flail chest, pneumo mediastinum, rib fracture pneumonia, sepsis, dka, hypoglycemia, overdose, carotid dissection, sinus abscess, acute fracture, acute CVA, ICH, SAH, TIA, meningitis, encephalitis, pseudotumor cerebri, temporal arteritis, sentinel bleed from ruptured aneurysm, hypertensive urgency or emergency, subdural hematoma, epidural hematoma, cerebellar or posterior stroke or other concerning pathology. FINAL IMPRESSION      1. Vascular dementia with paranoia          DISPOSITION/PLAN   DISPOSITION Decision To Transfer 03/29/2019 10:35:50 PM      PATIENT REFERREDTO:  No follow-up provider specified.     DISCHARGE MEDICATIONS:  New Prescriptions    No medications on file       DISCONTINUED MEDICATIONS:  Discontinued Medications    No medications on file              (Please note that portions ofthis note were completed with a voice recognition program.  Efforts were made to edit the dictations but occasionally words are mis-transcribed.)    Kate Stoner PA-C (electronically signed)           Kate Stoner PA-C  03/30/19 [facility]  03/30/19 [id number]

## 2019-03-30 NOTE — BH NOTE
Pt and writer completed psychosocial assessment and CSSR via patient and chart review. Pt denied SI/HI and contracted for safety. Pt was very pleasant and cooperative during the exam. Pt was fixated on perceived issues and was not easily redirectable. Pt feels that both her guardian and children are stealing money from her. Pt was unable to answer all of assessment questions due to fixation on perceived problems.

## 2019-03-30 NOTE — PROGRESS NOTES
Group Therapy Note    Date: 3/30/2019  Start Time: 8:45  End Time: 9:20  Number of Participants: 4    Type of Group: Healthy Living/Wellness    Patient's Goal: Pt's were encouraged to create and share positive, achievable goals for the day. Pt's were able to engage in \"Spring Bingo\" and \"Ice Breaker\" questions in order to interact with peers. Notes: Pt arrived to group on time and was engaged in both the activity and discussion. Pt was cooperative, pleasant and expressed a fondness for the group. Status After Intervention:  Unchanged    Participation Level:  Active Listener and Interactive    Participation Quality: Appropriate and Attentive      Speech:  normal      Thought Process/Content: Logical      Affective Functioning: Congruent      Mood: euthymic      Level of consciousness:  Alert      Response to Learning: Progressing to goal      Endings: None Reported    Modes of Intervention: Education, Support, Socialization and Activity      Discipline Responsible: /Counselor      Signature:  KESHAV Aleman

## 2019-03-30 NOTE — BH NOTE
Call to [facility]. Per pharmacy, patient has not had med filled there since the end of January 2019. Medication on file are: Senna 8.6mg bid prn. Paxil has not been filled since 2017. Will contact on call psychiatrist to obtain admission orders.

## 2019-03-30 NOTE — H&P
Hospital Medicine History & Physical      PCP: Bright Londono    Date of Admission: 3/30/2019    Date of Service: Pt seen/examined on 3/30/2019      Chief Complaint:  Dizziness     History Of Present Illness: The patient is a 80 y.o. female with dementia, glaucoma, depression, HTN  who presented to [facility] ED with complaint of dizziness and distrust of her court appointed guardian. Patient was seen and evaluated in the ED by the ED medical provider, patient was medically cleared for admission to geriatric psychiatry unit at [facility]. This note serves as an admission medical H&P.    PCP:  Dr. Vena Severs with [facility]   Tobacco use: denies   ETOH use: denies   Illicit drug use: denies and UDS is negative     Patient denies any medical complaints . She denies dizziness     Per ER record, patient's guardian requested she be admitted with concern that she will need to be placed in a long term care unit. Past Medical History:        Diagnosis Date    Allergic rhinitis 3/9/2016    Anemia     Anxious mood 3/9/2016    Depression 4/12/2011    Glaucoma        Past Surgical History:        Procedure Laterality Date    CATARACT REMOVAL  12/14    Glaucoma surgery as well    CATARACT REMOVAL WITH IMPLANT  4/2015    Glaucoma surgery as well    COLONOSCOPY      Unknown date    NOSE SURGERY         Medications Prior to Admission:    Prior to Admission medications    Medication Sig Start Date End Date Taking? Authorizing Provider   potassium chloride (KLOR-CON M) 20 MEQ extended release tablet Take 1 tablet by mouth daily for 5 days Take this medication when using Lasix.  3/20/19 3/25/19  Pauline Najera MD   Magnesium Oxide 200 MG TABS Take 200 mg by mouth daily 3/20/19   Pauline Najera MD   PARoxetine (PAXIL) 10 MG tablet Take 1 tablet by mouth every morning 10/5/17   Claudetta Parsley, MD   latanoprost (XALATAN) 0.005 % ophthalmic solution 1 drop nightly    Historical Provider, MD   vitamin B-12 (CYANOCOBALAMIN) 1000 MCG tablet Take 1,000 mcg by mouth daily    Historical Provider, MD   ferrous sulfate 325 (65 FE) MG tablet Take 325 mg by mouth daily (with breakfast)    Historical Provider, MD   Carboxymethylcellul-Glycerin (OPTIVE OP) Apply to eye    Historical Provider, MD   desloratadine (CLARINEX) 5 MG tablet One po qd 3/8/16   Renetta Ibrahim MD   Multiple Minerals-Vitamins (CITRACAL PLUS PO) Take 1 tablet by mouth 2 times daily    Historical Provider, MD   Multiple Vitamins-Minerals (MULTIVITAL) TABS Take 1 tablet by mouth 2 times daily    Historical Provider, MD       Allergies:  Patient has no known allergies. Social History:  The patient currently lives at home by herself     TOBACCO:   reports that she has never smoked. She has never used smokeless tobacco.  ETOH:   reports that she does not drink alcohol. Family History:   Positive as follows:        Problem Relation Age of Onset    Dementia Mother     Breast Cancer Sister     Cancer Brother         breast       REVIEW OF SYSTEMS:       Constitutional: Negative for fever   HENT: Negative for sore throat   Eyes: Negative for redness   Respiratory: Negative  for dyspnea, cough   Cardiovascular: Negative for chest pain   Gastrointestinal: Negative for vomiting, diarrhea   Genitourinary: Negative for hematuria   Musculoskeletal: Negative for arthralgias   Skin: Negative for rash   Neurological: Negative for syncope    Hematological: Negative for easy bruising/bleeding   Psychiatric/Behavorial: Per psychiatry team evaluation     PHYSICAL EXAM:    /86   Pulse 69   Temp 98.4 °F (36.9 °C) (Oral)   Resp 16   Ht 5' 4\" (1.626 m)   Wt 116 lb 9.6 oz (52.9 kg)   SpO2 100%   BMI 20.01 kg/m²     Gen:  Elderly female. No distress. Alert. Eyes: PERRL. No sclera icterus. No conjunctival injection. ENT: No discharge. Pharynx clear. Neck: No JVD. Trachea midline. Resp: No accessory muscle use. No crackles. No wheezes. No rhonchi. CV: Regular rate. Regular rhythm. No murmur. No rub. No edema. GI: Non-tender. Non-distended. Normal bowel sounds. Skin: Warm and dry. No nodule on exposed extremities. No rash on exposed extremities. M/S: No cyanosis. No joint deformity. No clubbing. Neuro: Awake. No focal neurologic deficit on exam.  Cranial nerves II through XII intact. Patient is able to ambulate without difficulty. Psych: Per psychiatry team evaluation     CBC:   Recent Labs     03/29/19 2202   WBC 4.6   HGB 11.9*   HCT 37.1   MCV 96.4        BMP:   Recent Labs     03/29/19 2202      K 4.1      CO2 26   BUN 18   CREATININE 0.7     LIVER PROFILE:   Recent Labs     03/29/19 2202   AST 19   ALT 13   BILITOT 0.4   ALKPHOS 65     UA:  Recent Labs     03/29/19 2202 03/29/19  2314   COLORU YELLOW  --    PHUR 8.5* 7.0   CLARITYU Clear  --    SPECGRAV 1.009  --    LEUKOCYTESUR Negative  --    UROBILINOGEN 0.2  --    BILIRUBINUR Negative  --    BLOODU Negative  --    GLUCOSEU Negative  --        U/A:    Lab Results   Component Value Date    NITRITE Negative 10/05/2017    COLORU YELLOW 03/29/2019    CLARITYU Clear 03/29/2019    SPECGRAV 1.009 03/29/2019    LEUKOCYTESUR Negative 03/29/2019    BLOODU Negative 03/29/2019    GLUCOSEU Negative 03/29/2019       CULTURES  None     EKG:  I have reviewed the EKG with the following interpretation:   NSR with sinus arrhythmia     RADIOLOGY  CT head 3/30/19  1. No acute intracranial abnormality. 2. Mild white matter hypoattenuation, likely the sequela of chronic small   vessel ischemia.  If there is persistent clinical concern for acute ischemia,   MRI would be the more sensitive modality. CXR 3/30/19  No acute cardiopulmonary disease.       ASSESSMENT/PLAN:    Psychosis   - per psychiatry team    ?Dementia   - on review of Dr. Martin Gomez office visit from 1/2019 she mentions frontotemporal lobe dementia, which is a diagnosis made by a neuropsychiatry eval in 2016- records not available for my review   - she has a legal guardian who accompanied her to the ER    Glaucoma  - per Dr. Carlos Retana, not using eye drops or following with ophthalmology  - patient admits no eye drop use in a few years     Depression  - per Dr. Carlos Retana, has refused meds for this in the past.     HTN  - BP is elevated on admission, better today  - per Dr. Carlos Retana patient has refused meds in the past  - will monitor VS per unit parameters      Mild Normocytic anemia  - PCP f/u    I reviewed her PCP Dr. Juvenal Newman office visit from 1/2019 to help supplement much of my information for the medical H&P. See care everywhere for review    She has no medical complaints or issues requiring daily monitoring at this time.   Should new issues arise, or if her BP becomes elevated persistently, please contact the IM service for re evaluation     Rosa Santacruz PA-C  3/30/2019 10:36 AM

## 2019-03-30 NOTE — PLAN OF CARE
Problem: Altered Mood, Deterioration in Function:  Goal: Ability to perform activities of daily living will improve  Outcome: Ongoing     Patient attending groups, social and visible on the unit. Denies needs at this time.

## 2019-03-31 PROCEDURE — 1240000000 HC EMOTIONAL WELLNESS R&B

## 2019-03-31 PROCEDURE — 6370000000 HC RX 637 (ALT 250 FOR IP): Performed by: PSYCHIATRY & NEUROLOGY

## 2019-03-31 NOTE — GROUP NOTE
Group Therapy Note    Date: March 31    Group Start Time: 0945  Group End Time: 1045  Group Topic: Psychoeducation    [address] [facility]    Zeina Gilbert      Group Therapy Note           Patient's Goal:  Patients were invited to participate in Goals & Leisure group where patients were encouraged to identify and share their daily goals. Patients were then asked to engage in a game of Socialization hipages Group where patients took turns pulling and stacking the hipages Group blocks while asking one another questions, encouraging interpersonal communication, memory recall, hand/eye coordination, and reminiscing. Notes:  Karin shared her daily goal was to \"take a warm shower,\" and actively participated in playing [address] [employer], speaking of the importance of her davis, her memories of her  and brothers during WWII and her travels, and providing peers with empathetic support. Status After Intervention:  Improved    Participation Level:  Active Listener and Interactive    Participation Quality: Appropriate, Attentive, Sharing and Supportive      Speech:  normal      Thought Process/Content: Flight of ideas      Affective Functioning: Congruent      Mood: euthymic      Level of consciousness:  Alert and Attentive      Response to Learning: Able to verbalize current knowledge/experience, Able to verbalize/acknowledge new learning, Able to retain information and Capable of insight      Endings: None Reported    Modes of Intervention: Education, Support, Socialization and Exploration      Discipline Responsible: Psychoeducational Specialist      Signature:  Zeina 581Brayden Simon

## 2019-03-31 NOTE — BH NOTE
Daughter, Yovany Bettencourt, emailed a copy of patient's mental health assessment from [facility] [address]. Copy placed in the treatment binder for Dr. Marcos Cordova to review.

## 2019-03-31 NOTE — PLAN OF CARE
Problem: Altered Mood, Deterioration in Function:  Goal: Ability to perform activities of daily living will improve  Description  Ability to perform activities of daily living will improve  Outcome: Ongoing  Goal: Able to verbalize reality based thinking  Description  Able to verbalize reality based thinking  Outcome: Ongoing  Goal: Maintenance of adequate nutrition will improve  Description  Maintenance of adequate nutrition will improve  Outcome: Ongoing    Pt refused morning vitamin B12 and Multivitamin meds. Pt stated that she normally takes B12 w/ lunch and her multivitamin w/ dinner at home. Nurse educated pt on the unit's medication schedules and offered to give these meds later. When this nurse attempted to administer these meds w/ lunch the pt still refused to take them. Pt is social and visible on unit conversing often w/ peers. Pt appears to be suspicious of staff at times. Has tangential speech and flight of ideas often becoming preoccupied w/ Zoroastrian topics. Pt is oriented to person, time, and place but disoriented to situation b/c she does not understand why she is here. Denies SI/HI/AVH and any needs at this time. Will continue to monitor.

## 2019-03-31 NOTE — BH NOTE
DONNA SPEAR [facility]  Initial Interdisciplinary Treatment Plan NOTE    Review Date & Time:03/31/19 1400    Patient was not in treatment team    Admission Type:   Admission Type: Involuntary    Reason for admission:  Reason for Admission: Per [place] at [facility] ER department patient was brought in for complaints of dizzy, shaking, refusing meds and food, poor hygiene, recent falls. Patient has a guardian and reported that she did not trust him.        Estimated Length of Stay Update: 5-7 days  Estimated Discharge Date Update: 4/5-4/7    PATIENT STRENGTHS:  Patient Strengths Strengths: Communication  Patient Strengths and Limitations:Limitations: Perceives need for assistance with self-care  Addictive Behavior:Addictive Behavior  In the past 3 months, have you felt or has someone told you that you have a problem with:  : None  Do you have a history of Chemical Use?: No  Do you have a history of Alcohol Use?: No  Do you have a history of Street Drug Abuse?: No  Histroy of Prescripton Drug Abuse?: No  Medical Problems:  Past Medical History:   Diagnosis Date    Allergic rhinitis 3/9/2016    Anemia     Anxious mood 3/9/2016    Depression 4/12/2011    Glaucoma        EDUCATION:   Learner Progress Toward Treatment Goals: Reviewed goals and plan of care    Method: Individual    Outcome: Needs reinforcement    PATIENT GOALS: n/a    PLAN/TREATMENT RECOMMENDATIONS UPDATE:Evaluaate and treat    GOALS UPDATE:   Time frame for Short-Term Goals: n/a    Maribel Bonner RN

## 2019-04-01 PROCEDURE — 1240000000 HC EMOTIONAL WELLNESS R&B

## 2019-04-01 PROCEDURE — 97165 OT EVAL LOW COMPLEX 30 MIN: CPT

## 2019-04-01 PROCEDURE — 97535 SELF CARE MNGMENT TRAINING: CPT

## 2019-04-01 PROCEDURE — 99232 SBSQ HOSP IP/OBS MODERATE 35: CPT | Performed by: PSYCHIATRY & NEUROLOGY

## 2019-04-01 RX ORDER — LANOLIN ALCOHOL/MO/W.PET/CERES
6 CREAM (GRAM) TOPICAL
Status: DISCONTINUED | OUTPATIENT
Start: 2019-04-01 | End: 2019-04-10 | Stop reason: HOSPADM

## 2019-04-01 RX ORDER — OLANZAPINE 10 MG/1
5 INJECTION, POWDER, LYOPHILIZED, FOR SOLUTION INTRAMUSCULAR 2 TIMES DAILY PRN
Status: DISCONTINUED | OUTPATIENT
Start: 2019-04-01 | End: 2019-04-10 | Stop reason: HOSPADM

## 2019-04-01 RX ORDER — TRAZODONE HYDROCHLORIDE 50 MG/1
25 TABLET ORAL NIGHTLY PRN
Status: DISCONTINUED | OUTPATIENT
Start: 2019-04-01 | End: 2019-04-10 | Stop reason: HOSPADM

## 2019-04-01 RX ORDER — RISPERIDONE 0.25 MG/1
0.5 TABLET, FILM COATED ORAL EVERY 6 HOURS PRN
Status: DISCONTINUED | OUTPATIENT
Start: 2019-04-01 | End: 2019-04-10 | Stop reason: HOSPADM

## 2019-04-01 RX ORDER — LORAZEPAM 0.5 MG/1
0.5 TABLET ORAL EVERY 4 HOURS PRN
Status: DISCONTINUED | OUTPATIENT
Start: 2019-04-01 | End: 2019-04-10 | Stop reason: HOSPADM

## 2019-04-01 ASSESSMENT — PAIN SCALES - GENERAL: PAINLEVEL_OUTOF10: 0

## 2019-04-01 NOTE — PLAN OF CARE
Problem: Altered Mood, Deterioration in Function:  Goal: Ability to perform activities of daily living will improve  3/31/2019 2039 by Ramakrishna Roberts RN  Outcome: Ongoing  Note:   Patient out in milieu, socializing with peers, laughing and chatting. Eating meals, denies SI/HI/AVH, having flight of ideas and loose associations. No scheduled meds due at this time. Denies pain.

## 2019-04-01 NOTE — PROGRESS NOTES
Nutrition Assessment    Type and Reason for Visit: Initial, Positive Nutrition Screen(MST=2; weight loss, poor appetite and intake)    Nutrition Recommendations:   1. Continue general diet order + add Ensure Enlive (chocolate) TID with meals  2. Monitor intakes, weights, and mental status    Nutrition Assessment: Patient is severely malnouished AEB severe muscle and fat wasting and history of poor intake. Patient is at risk for further compromise d/t ongoing poor intake r/t altered mental status. Will continue general diet order and add Ensure Enlive (chiocolate) TID with meals. Malnutrition Assessment:  · Malnutrition Status: Meets the criteria for severe malnutrition  · Context: Chronic illness  · Findings of the 6 clinical characteristics of malnutrition (Minimum of 2 out of 6 clinical characteristics is required to make the diagnosis of moderate or severe Protein Calorie Malnutrition based on AND/ASPEN Guidelines):  1. Energy Intake-Less than or equal to 50% of estimated energy requirement, (x2day LOS)    2. Weight Loss-No significant weight loss, in 1 month  3. Fat Loss-Severe subcutaneous fat loss, Triceps, Orbital  4. Muscle Loss-Severe muscle mass loss, Temples (temporalis muscle), Scapula (trapezius), Clavicles (pectoralis and deltoids), Interosseous  5. Fluid Accumulation-No significant fluid accumulation    6.   Strength-Not measured    Nutrition Risk Level: High    Nutrient Needs:  · Estimated Daily Total Kcal: 7088-3563(89-41LNEV/RJ)  · Estimated Daily Protein (g): 63-74(1.2-1.4g/kg)  · Estimated Daily Total Fluid (ml/day): 1500    Nutrition Diagnosis:   · Problem: Severe malnutrition  · Etiology: related to Cognitive or neurological impairment, Insufficient energy/nutrient consumption     Signs and symptoms:  as evidenced by Diet history of poor intake, Severe muscle loss, Severe loss of subcutaneous fat    Objective Information:  · Nutrition-Focused Physical Findings: Patient making the bed in her room on unit. She is wearing socks, thick pants, a gown, thick shirt and thick robe. Her hands are very cold and skin is dry. Patient's eyes bulging and hollow impressions below eye brows. She appears extremely thin with severe muscle and fat wasting. · Wound Type: None  · Current Nutrition Therapies:  · Oral Diet Orders: General   · Oral Diet intake: %, 0%(% of one meal since admission (2days))  · Anthropometric Measures:  · Ht: 5' 4\" (162.6 cm)   · Current Body Wt: 116 lb 9.6 oz (52.9 kg)  · Admission Body Wt: 116 lb 9.6 oz (52.9 kg)  · Usual Body Wt: 120 lb (54.4 kg)  · Ideal Body Wt: 120 lb (54.4 kg), % Ideal Body 97%  · BMI Classification: BMI 18.5 - 24.9 Normal Weight    Nutrition Interventions:   Continue current diet, Start ONS  Continued Inpatient Monitoring, Coordination of Care, Coordination of Community Care    Nutrition Evaluation:   · Evaluation: Goals set   · Goals: Patient will consume >75% of at least two meals per day and >50% of Ensure TID. Patient will remain weight stable duuring admission.      · Monitoring: Meal Intake, Supplement Intake, Weight, Mental Status/Confusion      Electronically signed by Jimmy Stephen on 4/1/19 at 2:42 PM    Contact Number: [phone]

## 2019-04-01 NOTE — PLAN OF CARE
Nutrition Problem: Severe malnutrition  Intervention: Food and/or Nutrient Delivery: Continue current diet, Start ONS  Nutritional Goals: Patient will consume >75% of at least two meals per day and >50% of Ensure TID. Patient will remain weight stable duuring admission.

## 2019-04-01 NOTE — PROGRESS NOTES
Poor per patient. Income:  SSI  Financial Management:  Pt. Reports independence without trouble. Leisure Interests:  Watch TV, spiritual programs, read, walks, likes nature  Medication Management:  Pt. Reports independence for med management. Health Management: per chart. ..refusing meds and food, poor hygiene, recent falls. Social Network:  One close neighbor. Stressors:  \"I wasn't under stress. \"  Rai Coates brought me out here and I shouldn't be here. \"  Coping Skills:  [place], walking    Pain  No  Rating:  Location:  Pain Medicine Status: ? Denies need  ? Pain med requested  ? RN notified. Cognition    A&Ox person and date. Disoriented to place, situation, patient appropriate and cooperative. Follows multiple step commands inconsistently. Upper Extremity ROM:    B shoulders approx 90 degrees flex/abduction  All other joint WFL    Upper Extremity Strength:    2/5 shoulders; Pt refused further testing. Upper Extremity Sensation:    No apparent deficits. Upper Extremity Proprioception:    No apparent deficits. Skin Integrity:  intact     Coordination and Tone:  WFL    Balance  Static Sitting:   Good   Dynamic Sitting:   Good   Static Standing:  Good   Dynamic Standing:  Good     Bed mobility:  NT  Supine to sit:  Sit to supine:  Scooting to head of bed:  Scooting in sitting:  Rolling:  Bridging:    Transfers:    Sit to stand:  Independent  Stand to sit: Independent  Bed/Chair to/from toilet:  Independent    Self Care: Toileting:  Independent  Grooming:  Supervision for washing hands and brushing teeth. Pt. Required extended amount of time to brush teeth secondary to thoroughness and desire to clean sink thoroughly. Dressing:  Pt. Refused. Self-Feeding:  Supervision for drinking coffee secondary to decreased safety. Pt. Reported that she hasn't eaten breakfast however came up with multiple reasons not to eat yet. Exercise / Activities Initiated:   Pt. Educated on role of OT.   Pt. Participated in:  ADL retraining, ACLS    Assessment of Deficits:   Pt demonstrated decreased activity tolerance, decreased safety awareness, decreased strength, decreased ROM, decreased cognition,  decreased bed mobility, decreased transfer skills, and decreased ADL/IADL status. Pt. Limited during evaluation by decreased cognition. At end of evaluation, pt. In gathering room. Goal(s) : To be met in 3 Visits:  1). Pt. To complete interest check list.    To be met in 5 Visits:  1). Pt. To complete ADM. 2). Pt. To demo independence to complete 3 grooming activities standing at sink. 3). Pt. To verbalize 3 coping skills. 4). Pt. To verbalize understanding of sleep hygiene techniques. Rehabilitation Potential:  Good for goals listed above. Strengths for achieving goals include:  PLOF  Barriers to achieving goals include:  Decreased cognition     Plan: To be seen 2-5x/week while in acute care setting for therapeutic exercises/act, ADL retraining, NMR and patient/caregiver ed/instruction.      Timed Code Treatment Minutes:    45 minutes    Total Treatment Time:   55  minutes    Signature and License Number    Jason Yoo OTR/L  #[id number]        If patient discharges from this facility prior to next visit, this note will serve as the Discharge Summary

## 2019-04-01 NOTE — H&P
Diagnosis Date    Allergic rhinitis 3/9/2016    Anemia     Anxious mood 3/9/2016    Depression 4/12/2011    Glaucoma         PAST SURGICAL HISTORY:    Past Surgical History:   Procedure Laterality Date    CATARACT REMOVAL  12/14    Glaucoma surgery as well    CATARACT REMOVAL WITH IMPLANT  4/2015    Glaucoma surgery as well    COLONOSCOPY      Unknown date    NOSE SURGERY         PROBLEM LIST:  Active Problems:    Glaucoma    Psychosis ([facility] [address]    Essential hypertension    Normocytic anemia  Resolved Problems:    * No resolved hospital problems. *       SOCIAL HISTORY:  Pt lives by herself and has homehealth. She has a guardian. 5 grown kids. . No hx of abuse, no legal issues. Social History     Socioeconomic History    Marital status:       Spouse name: Not on file    Number of children: 11    Years of education: 15    Highest education level: Not on file   Occupational History    Occupation: retired   Social Needs    Financial resource strain: Not on file    Food insecurity:     Worry: Not on file     Inability: Not on file   [employer] needs:     Medical: Not on file     Non-medical: Not on file   Tobacco Use    Smoking status: Never Smoker    Smokeless tobacco: Never Used   Substance and Sexual Activity    Alcohol use: No    Drug use: No    Sexual activity: Not Currently     Partners: Male   Lifestyle    Physical activity:     Days per week: Not on file     Minutes per session: Not on file    Stress: Not on file   Relationships    Social connections:     Talks on phone: Not on file     Gets together: Not on file     Attends Pentecostalism service: Not on file     Active member of club or organization: Not on file     Attends meetings of clubs or organizations: Not on file     Relationship status: Not on file    Intimate partner violence:     Fear of current or ex partner: Not on file     Emotionally abused: Not on file     Physically abused: Not on file     Forced sexual activity: Not on file   Other Topics Concern    Not on file   Social History Narrative    Not on file       OBJECTIVE:   Vitals:    04/01/19 0800   BP: 128/76   Pulse: 68   Resp: 14   Temp: 97.4 °F (36.3 °C)   SpO2: 99%       REVIEW OF SYSTEMS:   Reports no current cardiovascular, respiratory, gastrointestinal, genitourinary,   integumentary, neurological, musculoskeletal, or immunological symptoms today. PSYCHIATRIC:  See HPI above     PSYCHIATRIC EXAMINATION / MENTAL STATUS EXAM:     CONSTITUTIONAL:    Vitals:    Blood pressure 128/76, pulse 68, temperature 97.4 °F (36.3 °C), temperature source Oral, resp. rate 14, height 5' 4\" (1.626 m), weight 116 lb 9.6 oz (52.9 kg), SpO2 99 %, not currently breastfeeding.   General appearance:  [x]  appears age, []  appears older than stated age,     [x]  adequately dressed and groomed, [] disheveled,                [x]  in no acute distress, [] appears mildly distressed,      []  Other:      MUSCULOSKELETAL:   Gait:   [x] normal, [] antalgic, [] unsteady, [] in bed, gait not evaluated   Station:  [] erect, [] sitting, [] recumbent, [] other        Strength/tone:  [] muscle strength and tone appear consistent with age and condition     [x] atrophy      [] abnormal movements  PSYCHIATRIC:    Relatedness:  [x] cooperative, [] guarded, [] indifferent, [] hostile,      [] sedated  Speech:  [x] normal prosody, rate and volume but hyperverbal [] pressured, [] decreased volume,    [] slurred, [] halting, [] slowed, [] other     [] echolalia, [] incoherent, [] stuttering   Eye contact:  [x] direct, [] avoidant, [] intense  Kinetics:  [x] normal, [] increased, [] decreased  Mood:   [] stable, [] depressed, [x] anxious, [] irritable,     [] labile  Affect:   [] normal range, [] constricted, [] depressed, [] anxious,     [] angry, [x] blunted  Hallucinations  [x] denies, [] auditory,  [] visual,  [] olfactory, [] tactile  Delusions  [] none, [] grandiose,  [] jealous,  [x] persecutory, [] somatic,     [] bizarre  Preoccupations  [x] none, [] violence, [] obsessions, [] other     Suicidal ideation  [x] denies, [] endorses  Homicidal ideation [x] denies, [] endorses  Thought process: [] logical, [x] circumstantial, [] tangential, [] KASEY,     [] simplistic, [] disorganized  Associations:  [] logical and coherent, [] loosening, [x] perseverative  Insight:   [] good, [] fair, [x] poor  Judgment:  [] good, [] fair, [x] poor  Attention and concentration:     [] intact, [x] limited, [] able to focus on interview,     [] grossly impaired  Orientation:  [x] person, place, time, situation     [] disoriented to:     Memory:  Remote memory [x] intact, [] impaired     Recent memory  [] intact, [x] impaired          IMPRESSION    Axis I: Vascular dementia, delusional disorder  Axis II: deferred  Axis III: see medical hx  Axis IV: relational problems, poor coping skills    PLAN    1. Admit to Adult Behavioral Unit / Senior Behavioral Unit  2. Consult Internal Medicine to evaluate and treat medical conditions  3. Pt not wanting meds at this time  4. Occupational Therapy, Physical Therapy, Group Psychotherapy as tolerated   5. Reviewed treatment plan with patient including medication risks, benefits, side effects. Obtained informed consent for treatment.

## 2019-04-01 NOTE — BH NOTE
[id number]    Type of Group:relaxation    Level of Participation:100%      Comments: Pt in dining room eating snacks and interacting with peers

## 2019-04-01 NOTE — GROUP NOTE
Group Therapy Note    Date: April 1    Group Start Time: 1745  Group End Time: [address]: Psychoeducation    [address] [facility]      Group Therapy Note    Patient Goal: To identify positive ways to cope with emotions and feelings. Notes: America Nolasco identified multiple coping skills such, as \"reading, spending time with grandchildren, and cooking. \" America Nolasco appeared to have a bright affect. America Nolasco provided peers with positive feedback. Status After Intervention:  Improved    Participation Level:  Active Listener and Interactive    Participation Quality: Appropriate, Attentive and Supportive      Speech:  normal      Thought Process/Content: Linear      Affective Functioning: Congruent      Mood: bright      Level of consciousness:  Alert, Oriented x4 and Attentive      Response to Learning: Capable of insight and Able to change behavior      Endings: None Reported    Modes of Intervention: Education, Support, Socialization, Exploration, Clarifying, Problem-solving and Activity      Discipline Responsible: Psychoeducational Specialist      Signature:  Allyson Gaspar

## 2019-04-01 NOTE — BH NOTE
Patient was invited to attend 0900 AM community meeting by [place] and patient appeared to be sleeping and did not respond to [place] invitation.     Chris Hendrix

## 2019-04-02 PROCEDURE — 99232 SBSQ HOSP IP/OBS MODERATE 35: CPT | Performed by: PSYCHIATRY & NEUROLOGY

## 2019-04-02 PROCEDURE — 1240000000 HC EMOTIONAL WELLNESS R&B

## 2019-04-02 RX ORDER — RISPERIDONE 0.5 MG/1
0.5 TABLET, ORALLY DISINTEGRATING ORAL NIGHTLY
Status: DISCONTINUED | OUTPATIENT
Start: 2019-04-02 | End: 2019-04-10 | Stop reason: HOSPADM

## 2019-04-02 NOTE — BH NOTE
[address] [facility]  Day 3 Interdisciplinary Treatment Plan NOTE    Review Date & Time: 04/02/2019 945    Patient was not in treatment team    Admission Type:   Admission Type: Involuntary    Reason for admission:  Reason for Admission: [facility] at [facility] ER department patient was brought in for complaints of dizzy, shaking, refusing meds and food, poor hygiene, recent falls. Patient has a guardian and reported that she did not trust him. Estimated Length of Stay Update:  5-7 day  Estimated Discharge Date Update: 04/7-04/09    PATIENT STRENGTHS:  Patient Strengths Strengths: Communication  Patient Strengths and Limitations:Limitations: Perceives need for assistance with self-care  Addictive Behavior:Addictive Behavior  In the past 3 months, have you felt or has someone told you that you have a problem with:  : None  Do you have a history of Chemical Use?: No  Do you have a history of Alcohol Use?: No  Do you have a history of Street Drug Abuse?: No  Histroy of Prescripton Drug Abuse?: No  Medical Problems:  Past Medical History:   Diagnosis Date    Allergic rhinitis 3/9/2016    Anemia     Anxious mood 3/9/2016    Depression 4/12/2011    Glaucoma        Risk:  Fall RiskTotal: 98  Ajay Scale Ajay Scale Score: 21  BVC Total: 0  Change in scores no.  Changes to plan of Care  no    Status EXAM:   Status and Exam  Normal: No  Facial Expression: Worried  Affect: Appropriate  Level of Consciousness: Alert  Mood:Normal: No  Mood: Depressed, Suspicious  Motor Activity:Normal: No  Motor Activity: Decreased  Interview Behavior: Cooperative  Preception: [place] to Person, Nabil Nicks to Time, [place] to Place  Attention:Normal: No  Attention: Distractible  Thought Processes: Tangential  Thought Content:Normal: No  Thought Content: Paranoia  Hallucinations: None  Delusions: Yes  Delusions: Persecution  Memory:Normal: No  Memory: Poor Recent, Poor Remote  Insight and Judgment: No  Insight and Judgment: Poor Judgment, Poor Insight  Present Suicidal Ideation: No  Present Homicidal Ideation: No    Daily Assessment Last Entry:   Daily Sleep (WDL): Within Defined Limits         Patient Currently in Pain: Denies  Daily Nutrition (WDL): Within Defined Limits    Patient Monitoring:  Frequency of Checks: 4 times per hour, close    Psychiatric Symptoms:   Depression Symptoms  Depression Symptoms: No problems reported or observed.   Anxiety Symptoms  Anxiety Symptoms: Unexplained fears  Magi Symptoms  Magi Symptoms: Flight of ideas     Psychosis Symptoms  Delusion Type: Paranoid    Suicide Risk CSSR-S:  1) Within the past month, have you wished you were dead or wished you could go to sleep and not wake up? : No  2) Have you actually had any thoughts of killing yourself? : No  6) Have you ever done anything, started to do anything, or prepared to do anything to end your life?: No  Change in Result no Change in Plan of care no      EDUCATION:   Learner Progress Toward Treatment Goals: Reviewed goals and plan of care    Method: Individual    Outcome: Needs reinforcement    PATIENT GOALS: To take a shower    PLAN/TREATMENT RECOMMENDATIONS UPDATE:Evaluate and treat    GOALS UPDATE:   Time frame for Short-Term Goals: 2 days      Aravind Guidry RN

## 2019-04-02 NOTE — PLAN OF CARE
Problem: Altered Mood, Deterioration in Function:  Goal: Ability to perform activities of daily living will improve  Description  Ability to perform activities of daily living will improve  Outcome: Ongoing  Goal: Able to verbalize reality based thinking  Description  Able to verbalize reality based thinking  Outcome: Ongoing  Note:   Pt continues to be paranoid. Very preoccupied w/ guardian stating he is trying to scam her family and has been breaking into her house to steal her stuff. Pt was able to be redirected and calmed down easily.    Goal: Maintenance of adequate nutrition will improve  Description  Maintenance of adequate nutrition will improve  Outcome: Ongoing

## 2019-04-02 NOTE — GROUP NOTE
Group Therapy Note    Date: April 2    Group Start Time: 1005  Group End Time: 9831  Group Topic: Recreational  Participants: [address]      Group Therapy Note    Patient Goal: To engage in playing leisure jeopardy to identify positive coping skills, positive leisure activities, and positive leisure attractions Pt may utilize to improve quality of life. Notes: Karin identified multiple coping skills, leisure outlets, and leisure attractions. America Gaurav demonstrated positive teamwork and leadership skills. [place] Gaurav was observed encouraging peers to participate in group activity. Status After Intervention:  Improved    Participation Level:  Active Listener and Interactive    Participation Quality: Appropriate, Attentive and Supportive      Speech:  normal      Thought Process/Content: Linear      Affective Functioning: Congruent      Mood: bright      Level of consciousness:  Alert and Attentive      Response to Learning: Capable of insight and Able to change behavior      Endings: None Reported    Modes of Intervention: Education, Support, Socialization, Exploration, Clarifying, Problem-solving and Activity      Discipline Responsible: Psychoeducational Specialist      Signature:  Allyson Gaspar

## 2019-04-02 NOTE — BH NOTE
Case management note: This writer will be providing discharging planning and concurrent review on this patient. Iram b[place]ish [place] MSW LSW  Phone:  [phone], Fax [phone]    This patient is being treated by Dr. Refugia Cogan, MD, psychiatry. Anticipated discharge date is April 8th, 2019  [place] Rojas is seeking memory care for this patient which is congruent with this patient's level of care requirement. Writer will attempt to refer this patient to [facility] for memory care. This patient has a court appointed guardian.

## 2019-04-02 NOTE — GROUP NOTE
Group Therapy Note    Date: April 2    Group Start Time: 1345  Group End Time: 7127  Group Topic: Recreational  Participants: [address] [facility]      Group Therapy Note    Patient Goal: To listen to music, stretch, exercise, dance, and socialize to improve mood, health, and quality of life. Notes: Pt stretched, danced, exercised, and socialized in group to improve mood, health, and quality of life. Rodney Emory socialized about dancing and music. Karin related the group topic to [employer]ism multiple times. Karin required moderate redirections for telling peers what they need to be doing during group. Rodney Cat was easily redirected.     Status After Intervention:  Unchanged    Participation Level: Interactive and Monopolizing    Participation Quality: Appropriate, Attentive and Sharing      Speech:  pressured      Thought Process/Content: Flight of ideas  Perseverating      Affective Functioning: Congruent      Mood: anxious      Level of consciousness:  Preoccupied      Response to Learning: Capable of insight, Able to change behavior and Progressing to goal      Endings: None Reported    Modes of Intervention: Education, Support, Socialization, Exploration, Clarifying, Problem-solving and Activity      Discipline Responsible: Psychoeducational Specialist      Signature:  Amrit [place]

## 2019-04-02 NOTE — PROGRESS NOTES
Department of Psychiatry  Attending Progress Note    Admission Date:    3/30/2019    Chief complaint / Reason for Admission:  Psychosis, inability to care for self    Patient's chart was reviewed, case was discussed with nursing/OT/RT staff, and collaborated with  about the treatment plan. SUBJECTIVE:   Over last 24 hours:  Behavioral outbursts: No   Non-aggressive behavioral disturbance: No  Medication compliant: No  Need for seclusion/restraints: No  Sleeping adequately:  Yes  Appetite adequate: Yes  Attending groups: No    On today's interview:   Patient was cooperative with interview. She is not aware why she is in the hospital. Her thought process is notably tangential with loose associations. Her speech is pressured and she rambles. She is focused on Druze themes but was not clearly delusional. She was focused on her Druze background of being a seventh [facility] [place]. She could not provide relevant history regarding how she came to be in the hospital. She does indicate that she felt that she did not need the services that were being offered by home health. She seems to have poor insight into her needs and deficits.     Suicidal ideation: Denies  Homicidal ideation: Denies  Medication side effects: No, patient has been refusing medication    ROS: Patient has new complaints: no    Current Medications Ordered:   melatonin  6 mg Oral Daily    vitamin B-12  1,000 mcg Oral Daily    therapeutic multivitamin-minerals  1 tablet Oral Daily      PRN Meds: LORazepam, risperiDONE, traZODone, OLANZapine, acetaminophen, magnesium hydroxide     Objective:     PE:    /76   Pulse 68   Temp 97.4 °F (36.3 °C) (Oral)   Resp 14   Ht 5' 4\" (1.626 m)   Wt 116 lb 9.6 oz (52.9 kg)   SpO2 99%   BMI 20.01 kg/m²       Motor / Gait: no abnormalities noted, normal gait and station  AIMS: 0    Mental Status Examination:    Appearance: -American female, appears stated age,  in chair, wearing Multi-level arthritic changes with disc space narrowing and foraminal as well as spinal stenosis.  Appointment with Dr. June Bernal. Discussed at patient's visit. Bathrobe, Disheveled but fair hygiene  Behavior/Attitude toward examiner:  cooperative, attentive and Fair eye contact  Speech:  Pressured, rambling  Mood:  \"You have nice people here\"  Affect:  Mildly elevated  Thought processes: Tangential with loose associations  Thought Content: Denies SI, Denies HI, + Paranoia, + religiously focused  Perceptions: Denies AVH, Not actively RTIS  Attention: Distractible  Abstraction: [place]  Cognition: Average MARY, Alert and oriented to person, place, time, and situation, recall Impaired per history  Insight: impaired insight   Judgment: impaired judgment      LAB: Reviewed labs from last 24 hours      Dx:   Primary Psychiatric (DSM V) Diagnosis: Major neurocognitive disorder due to multiple etiologies, with behavioral disturbance  Secondary Psychiatric (DSM V) Diagnoses: None  Chemical Dependency Diagnoses: None  Active Medical Diagnoses: Glaucoma, hypertension    All conditions detailed above are being treated while patient is hospitalized. Tx plan: Generally: prevent self injury, stabilize affect, restore sleep, treat depression, treat anxiety, establish/maintain aftercare, increase coping mechanisms, improve medication compliance. All conditions present on admission are being treated while pt is hospitalized. Discussed PHP after discharge as part of transition back to the community. Legal Status: Voluntary per guardian    1. Dementia with behavioral disturbance: It appears as though medication refusal is a chronic issue for patient. History is not fully clear with respect to premorbid baseline behavior and approach to medical care. Will need to clarify this with guardian and family. Will need to further discuss goals of care.  -Start melatonin 6 mg Q 2000  -PRN risperidone 0.5 mg Q6 for agitation      3. Chemical Dependency Issues:  No issues.       Function:  -Consult physical therapy  -Consult occupational therapy  -Falls precautions    Medical Problems:  Internal medicine has been consulted. Appreciate recs. 1. Apparently patient has refused medications on an outpatient basis for both glaucoma and hypertension. She is on the home medications for these Conditions as a result. Code Status: Full code    Disposition:    -Housing: Own home  -Current outpatient follow-up: Primary care through [facility]  -Discharge planning is incomplete    Estimated length of stay: 5-7 days    Prognosis:  Guarded     Criteria for Discharge:  Not psychotic, no homicidal,  Not suicidal, sleeping well, affect stabl, eating well, aftercare arranged, able to be managed at a lower level of care. Total time with patient was 40 minutes and more than 50 % of that time was spent counseling the patient on their symptoms, treatment and expected goals.     Rachel Norman MD  Staff Psychiatrist

## 2019-04-02 NOTE — GROUP NOTE
Group Therapy Note    Date: April 2    Group Start Time: 1115  Group End Time: 1200  Group Topic: Psychoeducation  Number of Participants: [address] [place]      Group Therapy Note    Group Goal: to discuss favorite music, sports, seasons, and food as a group to promote relationship building and encouragement to participate in favorite activities. Notes:  Boy Hogan actively participated in group discussion. Pt required multiple redirections for interrupting peers and attempting to explain directions to peers while they were actively participating in activity. Karin often related group favorites to [employer] of the [employer] and davis\" and stated she Preet Abel brings things back to being Tanisha Hernandez. \" Boy Hogan shared her favorite type of music is \"classical\" and she enjoys \"baseball. \"     Status After Intervention:  Improved    Participation Level: Interactive and Monopolizing    Participation Quality: Appropriate, Attentive and Sharing      Speech:  normal      Thought Process/Content: Flight of ideas      Affective Functioning: Congruent      Mood: euthymic      Level of consciousness:  Alert and Attentive      Response to Learning: Progressing to goal    Endings: None Reported    Modes of Intervention: Education, Support, Socialization, Exploration, Problem-solving, Activity and Media      Discipline Responsible: Psychoeducational Specialist      Signature:  TONI RasconBC

## 2019-04-02 NOTE — PLAN OF CARE
Pt out in milieu watching tv with peers at shift start and ambulated to room shortly after to rest for the night, pt refused night time melatonin stating she doesn't take meds. Pt denied any needs, pleasant and social, no falls, no injuries noted. Lights turned out, pt in bed resting w eyes closed, rr even unlabored.

## 2019-04-03 PROCEDURE — 99232 SBSQ HOSP IP/OBS MODERATE 35: CPT | Performed by: PSYCHIATRY & NEUROLOGY

## 2019-04-03 PROCEDURE — 97530 THERAPEUTIC ACTIVITIES: CPT

## 2019-04-03 PROCEDURE — 6370000000 HC RX 637 (ALT 250 FOR IP): Performed by: PSYCHIATRY & NEUROLOGY

## 2019-04-03 PROCEDURE — 1240000000 HC EMOTIONAL WELLNESS R&B

## 2019-04-03 RX ORDER — M-VIT,TX,IRON,MINS/CALC/FOLIC 27MG-0.4MG
1 TABLET ORAL NIGHTLY
Status: DISCONTINUED | OUTPATIENT
Start: 2019-04-03 | End: 2019-04-10 | Stop reason: HOSPADM

## 2019-04-03 RX ORDER — CHOLECALCIFEROL (VITAMIN D3) 125 MCG
1000 CAPSULE ORAL NIGHTLY
Status: DISCONTINUED | OUTPATIENT
Start: 2019-04-03 | End: 2019-04-10 | Stop reason: HOSPADM

## 2019-04-03 RX ADMIN — CYANOCOBALAMIN TAB 500 MCG 1000 MCG: 500 TAB at 19:51

## 2019-04-03 RX ADMIN — RISPERIDONE 0.5 MG: 0.25 TABLET ORAL at 21:00

## 2019-04-03 RX ADMIN — MELATONIN 3 MG ORAL TABLET 6 MG: 3 TABLET ORAL at 19:51

## 2019-04-03 NOTE — GROUP NOTE
Group Therapy Note    Date: April 3    Group Start Time: 1345  Group End Time: 4003  Group Topic: Recreational  Number of Participants: [facility]      Group Therapy Note    Group Goal: to participate in Music Bingo activity and socialize with peers to improve mood and overall quality of life. Notes:  Jyoti Rocha actively participated in group activity. Pt was observed smiling, laughing, singing along, and dancing to the music throughout group. Joyti Rocha appropriately socialized with peers and received assistance from peer in covering the correct band/artist when necessary. Jyoti oRcha shared stories often in group that were loosely associated with group topic, however, she understood redirection and recognized she wasn't on topic when made aware. Pt expressed enjoyment of activity. Status After Intervention:  Improved    Participation Level:  Active Listener and Interactive    Participation Quality: Appropriate, Attentive and Sharing      Speech:  normal      Thought Process/Content: Flight of ideas      Affective Functioning: Congruent      Mood: euthymic      Level of consciousness:  Alert and Attentive      Response to Learning: Able to change behavior and Progressing to goal      Endings: None Reported    Modes of Intervention: Support, Socialization, Activity, Movement and Media      Discipline Responsible: Psychoeducational Specialist      Signature:  CHAYA Fisher

## 2019-04-03 NOTE — PROGRESS NOTES
Department of Psychiatry  Attending Progress Note    Admission Date:    3/30/2019    Chief complaint / Reason for Admission:  Psychosis, inability to care for self    Patient's chart was reviewed, case was discussed with nursing/OT/RT staff, and collaborated with  about the treatment plan. SUBJECTIVE:   Over last 24 hours:  Behavioral outbursts: No   Non-aggressive behavioral disturbance: No  Medication compliant: No  Need for seclusion/restraints: No  Sleeping adequately:  Yes  Appetite adequate: Yes  Attending groups: Yes    On today's interview:   Patient continues to refuse to take any medications. She believes that her doctor, presumably PCP, has told her not to take any medications. This, of course is not the case, and chart review indicates that she refused medications offered by her PCP as well. She continues to be paranoid about various issues: believes that her guardian has been trying to pretend to be her son, and \"scam\" her family, and was paranoid about her clothing being removed from her locker and placed in the community closet. With that being said, she has been redirectable and has not acted out on the unit based upon this paranoia. This morning patient was quite a bit more calm than she was yesterday. In fact she apologized for being irritable and argumentative with me yesterday. She continues to be tangential and perseverate on certain teams. She again told me several stories that she has repeated throughout her admission. She continues to lack insight into the need to take medication but today she did say that she has typically taken her vitamins at nighttime and has been refusing to take them in the morning because of this. She did at least superficially agreed to take her medications if I change them all to nighttime. I again indicated to her that the plan was to transition to a nursing facility from here and today at least she did not argue this point.  She continues to be in disagreement with the plan but we did discuss the fact that she has gotten along well with her peers and the staff here I emphasized to her that the facility we would find for her would be similarly nice and allow her Positive social engagement    Suicidal ideation: Denies  Homicidal ideation: Denies  Medication side effects: No, patient has been refusing medication    ROS: Patient has new complaints: no    Current Medications Ordered:   risperiDONE  0.5 mg Oral Nightly    melatonin  6 mg Oral Daily    vitamin B-12  1,000 mcg Oral Daily    therapeutic multivitamin-minerals  1 tablet Oral Daily      PRN Meds: LORazepam, risperiDONE, traZODone, OLANZapine, acetaminophen, magnesium hydroxide     Objective:     PE:    /72   Pulse 72   Temp 98.1 °F (36.7 °C) (Oral)   Resp 15   Ht 5' 4\" (1.626 m)   Wt 116 lb 9.6 oz (52.9 kg)   SpO2 100%   BMI 20.01 kg/m²       Motor / Gait: no abnormalities noted, normal gait and station  AIMS: 0    Mental Status Examination:    Appearance: -American female, appears stated age,  in chair, wearing pajamas, Fair grooming and hygiene  Behavior/Attitude toward examiner:  Cooperative, much calmer than yesterday   Speech:  Pressured, Hyperverbal  Mood:  \"I'm sorry about the other day\"  Affect:  Euthymic  Thought processes: Tangential with loose associations  Thought Content: Denies SI, Denies HI, + Paranoia, Though less focused on this today + religiously focused  Perceptions: Denies AVH, Not actively RTIS  Attention: Distractible  Abstraction: [place]  Cognition: Average MARY, Alert and oriented to person, place, But not time or situation, recall Impaired  Insight: impaired insight   Judgment: impaired judgment      LAB: Reviewed labs from last 24 hours      Dx:   Primary Psychiatric (DSM V) Diagnosis: Major neurocognitive disorder due to multiple etiologies, with behavioral disturbance  Secondary Psychiatric (DSM V) Diagnoses: None  Chemical Dependency Diagnoses: None  Active Medical Diagnoses: Glaucoma, hypertension    All conditions detailed above are being treated while patient is hospitalized. Tx plan: Generally: prevent self injury, stabilize affect, restore sleep, treat depression, treat anxiety, establish/maintain aftercare, increase coping mechanisms, improve medication compliance. All conditions present on admission are being treated while pt is hospitalized. Discussed PHP after discharge as part of transition back to the community. Legal Status: Voluntary per guardian    1. Dementia with behavioral disturbance:  -Continue to offer melatonin 6 mg Q 2000  -Continue to offer risperidone 0.5 mg QHS. I did discuss this medication with her guardian including the black box warning. Consent obtained. -PRN risperidone 0.5 mg Q6 for agitation      3. Chemical Dependency Issues:  No issues. Function:  -Consult physical therapy  -Consult occupational therapy  -Falls precautions    Medical Problems:  Internal medicine has been consulted. Appreciate recs. 1. Continue to offer daily multivitamin and vitamin B 12. Code Status: Full code    Disposition:    -Housing: Own home. However patient is no longer safe to live in this environment alone. Home healthcare has been attempted and has failed due to her refusal to engage. The next safest level of care for her is a nursing facility which we are pursuing through her guardian at this time.  -Current outpatient follow-up: Primary care through [facility]  -Discharge planning is incomplete    Estimated length of stay: 5-7 days    Prognosis:  Guarded     Criteria for Discharge:  Not psychotic, no homicidal,  Not suicidal, sleeping well, affect stabl, eating well, aftercare arranged, able to be managed at a lower level of care. Total time with patient was 40 minutes and more than 50 % of that time was spent counseling the patient on their symptoms, treatment and expected goals.     Jarivs Justin MD  Staff Psychiatrist

## 2019-04-03 NOTE — PROGRESS NOTES
Department of Psychiatry  Attending Progress Note    Admission Date:    3/30/2019    Chief complaint / Reason for Admission:  Psychosis, inability to care for self    Patient's chart was reviewed, case was discussed with nursing/OT/RT staff, and collaborated with  about the treatment plan. SUBJECTIVE:   Over last 24 hours:  Behavioral outbursts: No   Non-aggressive behavioral disturbance: No  Medication compliant: No  Need for seclusion/restraints: No  Sleeping adequately:  Yes  Appetite adequate: Yes  Attending groups: No    On today's interview:   Patient refused scheduled melatonin last night. She also continues to refuse vitamins. She claims that her primary care physician stopped all of her medications. I called and spoke to her guardian for an extended period of time today. Provided additional history regarding the fact that she has had intermittent paranoia for the last year but this is progressively worsened. Patient is now convinced that he is stealing from her. This led to her leaving her home in the middle of the night going to the [facility] house. She also has been refusing homecare in the community. No longer able to maintain her self. At this time guardian is interested and nursing home placement. On interview patient remains tangential with loose associations and pressured. She was initially pleasant and calm, became irritable and argumentative when I informed her of the tentative plan for nursing home placement. She has no insight into her deficits or paranoia.     Suicidal ideation: Denies  Homicidal ideation: Denies  Medication side effects: No, patient has been refusing medication    ROS: Patient has new complaints: no    Current Medications Ordered:   risperiDONE  0.5 mg Oral Nightly    melatonin  6 mg Oral Daily    vitamin B-12  1,000 mcg Oral Daily    therapeutic multivitamin-minerals  1 tablet Oral Daily      PRN Meds: LORazepam, risperiDONE, traZODone, OLANZapine, acetaminophen, magnesium hydroxide     Objective:     PE:    /72   Pulse 72   Temp 98.1 °F (36.7 °C) (Oral)   Resp 15   Ht 5' 4\" (1.626 m)   Wt 116 lb 9.6 oz (52.9 kg)   SpO2 100%   BMI 20.01 kg/m²       Motor / Gait: no abnormalities noted, normal gait and station  AIMS: 0    Mental Status Examination:    Appearance: -American female, appears stated age,  in chair, wearing Bathrobe, [address] grooming and hygiene  Behavior/Attitude toward examiner:  Initially cooperative became argumentative when the topic of nursing home placement came up  Speech:  Pressured, rambling  Mood:  \"I'm going back to my home! \"  Affect:  Irritable  Thought processes: Tangential with loose associations  Thought Content: Denies SI, Denies HI, + Paranoia, + religiously focused  Perceptions: Denies AVH, Not actively RTIS  Attention: Distractible  Abstraction: [place]  Cognition: Average MARY, Alert and oriented to person, place, time, and situation, recall Impaired per history  Insight: impaired insight   Judgment: impaired judgment      LAB: Reviewed labs from last 24 hours      Dx:   Primary Psychiatric (DSM V) Diagnosis: Major neurocognitive disorder due to multiple etiologies, with behavioral disturbance  Secondary Psychiatric (DSM V) Diagnoses: None  Chemical Dependency Diagnoses: None  Active Medical Diagnoses: Glaucoma, hypertension    All conditions detailed above are being treated while patient is hospitalized. Tx plan: Generally: prevent self injury, stabilize affect, restore sleep, treat depression, treat anxiety, establish/maintain aftercare, increase coping mechanisms, improve medication compliance. All conditions present on admission are being treated while pt is hospitalized. Discussed PHP after discharge as part of transition back to the community. Legal Status: Voluntary per guardian    1.   Dementia with behavioral disturbance:  -Continue to offer melatonin 6 mg Q 2000  -Start risperidone 0.5 mg QHS. I did discuss this medication with her guardian including the black box warning. Consent obtained. -PRN risperidone 0.5 mg Q6 for agitation      3. Chemical Dependency Issues:  No issues. Function:  -Consult physical therapy  -Consult occupational therapy  -Falls precautions    Medical Problems:  Internal medicine has been consulted. Appreciate recs. 1. Apparently patient has refused medications on an outpatient basis for both glaucoma and hypertension. She is on the home medications for these conditions as a result. Code Status: Full code    Disposition:    -Housing: Own home  -Current outpatient follow-up: Primary care through [facility]  -Discharge planning is incomplete    Estimated length of stay: 5-7 days    Prognosis:  Guarded     Criteria for Discharge:  Not psychotic, no homicidal,  Not suicidal, sleeping well, affect stabl, eating well, aftercare arranged, able to be managed at a lower level of care. Total time with patient was 40 minutes and more than 50 % of that time was spent counseling the patient on their symptoms, treatment and expected goals.     Toribio Porter MD  Staff Psychiatrist bilateral lower extremity ROM was WFL (within functional limits)/bilateral upper extremity ROM was WFL (within functional limits) bilateral lower extremity ROM was WFL (within functional limits)/bilateral upper extremity ROM was WFL (within functional limits)

## 2019-04-03 NOTE — PROGRESS NOTES
Inpatient Occupational Therapy  Evaluation and Treatment    Unit:  [facility]  Date:  4/3/2019  Patient Name:    Manish Puga  Admitting diagnosis:  Psychosis, unspecified psychosis type [facility]) Meghann Rhodes Date:  3/30/2019  Precautions/Restrictions/WB Status/ Lines/ Wounds/ Oxygen:  Up as tolerated  Treatment Time:  16:18-16:46  Treatment Number:  2    Subjective:  Pt. Received seated at table in gathering room. \"I'm ready to go when they say I can go. \"      Discharge Recommendations: LTC    DME needs for discharge:   Defer to next level of care. AM-PAC Score: 18     Home Health S4 Level: ? NA   ? Level 1- Standard  ? Level 2- Social  ? Level 3- Safety  ? Level 4- Sick    ACLS:  Completed 4/1/2019  Mode 3.4  Engaging Abilities and Following Safety Precautions When a Person Can Sustain Actions on Objects     DESCRIPTION:    54% Cognitive Assistance: The person needs 24 hour nursing care to sequence throughout the routine steps of toileting, bathing, grooming, and dressing. 54% moderate cognitive assistance is required to go to the next step in self care activities. Individual preferences for repetitive actions that the person likes to do may be honored. 10% Physical Assistance for fine motor actions on all objects used in ADLs. Physical barriers or alarms to prevent getting lost and attempting to walk on anything other than flat surfaces without an escort. Put bed rails down to prevent attempts to climb over the top. Pain  No  Rating:  Location:  Pain Medicine Status: ? Denies need  ? Pain med requested  ? RN notified. Cognition    A&Ox person and date. Disoriented to place, situation, patient appropriate and cooperative. Follows multiple step commands inconsistently. Balance  Static Sitting:   Good   Dynamic Sitting:   Good   Static Standing:  Good   Dynamic Standing:  Good     Thera Activity  Interest checklist:  Pt required extra time to complete.   Pt participated well, smiling and sharing stories and she chose activities that she enjoyed. Completed up to Leisure at home. Plan to complete checklist next visit. Likes: 25  Ok: 4  Don't like:  12    Exercise / Activities Initiated:   Pt. Educated on role of OT. Pt. Participated in:  thera act. Assessment of Deficits:   Pt participated in completing interest checklist.  Pt. Pleasant and participated well. Goal(s) : To be met in 3 Visits:  1). Pt. To complete interest check list.  (progressing 4/3/2019)    To be met in 5 Visits:  1). Pt. To complete ADM. 2). Pt. To demo independence to complete 3 grooming activities standing at sink. 3). Pt. To verbalize 3 coping skills. 4). Pt. To verbalize understanding of sleep hygiene techniques. Plan:  Continue OT per POC.     Timed Code Treatment Minutes:   28  minutes    Total Treatment Time:   28  minutes    Signature and License Number    NASREEN Garcia/L  #[id number]        If patient discharges from this facility prior to next visit, this note will serve as the Discharge Summary

## 2019-04-03 NOTE — BH NOTE
Time: 2030    Type of Group:relaxation  Level of Participation:100%    Comments:pt out at the table eating snack socializing with peers.

## 2019-04-03 NOTE — GROUP NOTE
Group Therapy Note    Date: April 3    Group Start Time: 1000  Group End Time: 4712  Group Topic: Psychoeducation  Number of Participants: [address]      Group Therapy Note    Group Goal: to identify positive and negative coping skills, discuss when and how to use coping skills, and participate in group discussion about the coping skills utilized within songs. Notes:  Craig Hernandez actively participated in group discussion and practiced active listening skills when peers were sharing. Pt needed redirection to topic multiple times due to loose associations, often relating her coping skills to \"inviting people over to my house\" but not stating more than \"and I coped with it. \" With prompting and encouragement, Craig Hernandez shared that she uses \"walking with my daughter\" and \"thinking positively\" as coping skills. Karin expressed enjoyment of activity and was able to verbalize learning coping skills during group. Status After Intervention:  Improved    Participation Level:  Active Listener, Interactive and Monopolizing    Participation Quality: Appropriate, Attentive, Sharing and Supportive      Speech:  normal      Thought Process/Content: Flight of ideas      Affective Functioning: Congruent      Mood: euthymic      Level of consciousness:  Alert and Attentive      Response to Learning: Able to verbalize/acknowledge new learning, Capable of insight and Progressing to goal      Endings: None Reported    Modes of Intervention: Education, Support, Socialization, Exploration, Clarifying, Problem-solving, Activity and Media      Discipline Responsible: Psychoeducational Specialist      Signature:  CHAYA Hudson

## 2019-04-03 NOTE — GROUP NOTE
Group Therapy Note    Date: April 3    Group Start Time: 1115  Group End Time: 1155  Group Topic: Psychoeducation  Participants: Vish Junior      Group Therapy Note    Patient Goal: to identify positive ways to improve health and wellness and to complete a progressive muscle relaxation exercise to achieve a relaxed state. Notes: Pt identified multiple ways to improve health and wellness. Pt required maximum prompts to stay on topic in group. Pt appeared to achieve a relaxed state after completing progressive muscle relaxation exercise. Pt was provided with the informational handout 10 Ways to Improve Your Health.      Status After Intervention:  Unchanged    Participation Level: Interactive and Monopolizing    Participation Quality: Appropriate and Attentive      Speech:  pressured      Thought Process/Content: Flight of ideas      Affective Functioning: Exaggerated      Mood: anxious, euthymic and labile      Level of consciousness:  Alert and Attentive      Response to Learning: Capable of insight and Able to change behavior      Endings: None Reported    Modes of Intervention: Education, Support, Socialization, Exploration, Clarifying, Problem-solving and Activity, movement      Discipline Responsible: Psychoeducational Specialist      Signature:  Linnea Albright [place]

## 2019-04-03 NOTE — GROUP NOTE
Group Therapy Note    Date: April 3    Group Start Time: 0845  Group End Time: 0900  Group Topic: [address] [place] [address]      Group Therapy Note    Patient Goal: To actively listen to unit guidelines and set a daily goal.     Notes: Cristobal Rankin presented to be delusional in group as evident by interrupting writer and stating to [employer] on the wall keep moving. The around the world photo moved and so did the words under the pictures. They all just keep moving spots. I like them. \" Pt was easily redirected to actively listen to unit guidelines. Karin required moderate redirections to activity listen in group and to stay on topic during group discussion. Cristobal Rankin shared her goal is \"to get stronger. \"     Status After Intervention:  Unchanged    Participation Level: Interactive and Monopolizing    Participation Quality: Appropriate, Attentive and Sharing      Speech:  pressured      Thought Process/Content: Delusional  Hallucinating  Flight of ideas      Affective Functioning: Exaggerated      Mood: anxious      Level of consciousness:  Preoccupied      Response to Learning: Capable of insight, Able to change behavior and Progressing to goal      Endings: None Reported    Modes of Intervention: Education, Support, Socialization, Exploration, Clarifying, Problem-solving and Activity      Discipline Responsible: Psychoeducational Specialist      Signature:  Preeti Rey, [address]

## 2019-04-03 NOTE — PLAN OF CARE
Pt is alert and up on the unit. She refuses her medications. She states \" My doctor told me not to take any medication\". Educated on medication benefits and she continues to refuse. She states \"God will take care of me\". She continues to be paranoid about her belongings, and thought some her clothes may have been brought here and put in the community closet. Pt was allowed to look in the closet to verify that her clothing was not there. She denies SI/HI and AVH.

## 2019-04-04 PROCEDURE — 1240000000 HC EMOTIONAL WELLNESS R&B

## 2019-04-04 PROCEDURE — 6370000000 HC RX 637 (ALT 250 FOR IP): Performed by: PSYCHIATRY & NEUROLOGY

## 2019-04-04 PROCEDURE — 97535 SELF CARE MNGMENT TRAINING: CPT

## 2019-04-04 PROCEDURE — 99232 SBSQ HOSP IP/OBS MODERATE 35: CPT | Performed by: PSYCHIATRY & NEUROLOGY

## 2019-04-04 RX ADMIN — CYANOCOBALAMIN TAB 500 MCG 1000 MCG: 500 TAB at 20:07

## 2019-04-04 RX ADMIN — MELATONIN 3 MG ORAL TABLET 6 MG: 3 TABLET ORAL at 20:08

## 2019-04-04 RX ADMIN — RISPERIDONE 0.5 MG: 0.5 TABLET, ORALLY DISINTEGRATING ORAL at 20:08

## 2019-04-04 RX ADMIN — MULTIPLE VITAMINS W/ MINERALS TAB 1 TABLET: TAB at 20:08

## 2019-04-04 NOTE — PROGRESS NOTES
Inpatient Occupational Therapy  Evaluation and Treatment    Unit:  [facility]  Date:  4/4/2019  Patient Name:    Hayde Ordaz  Admitting diagnosis:  Psychosis, unspecified psychosis type [facility]) Joyce Jones Date:  3/30/2019  Precautions/Restrictions/WB Status/ Lines/ Wounds/ Oxygen:  Up as tolerated  Treatment Time:  15:40-15:10  Treatment Number:  3    Subjective:  Pt. Received seated at table in gathering room. Pt. Agreeable to therapy session. Pt. Presented as cooperative, smiling and talkative. Discharge Recommendations: LTC    DME needs for discharge:   Defer to next level of care. AM-PAC Score: 18     Home Health S4 Level: ? NA   ? Level 1- Standard  ? Level 2- Social  ? Level 3- Safety  ? Level 4- Sick    ACLS:  Completed 4/1/2019  Mode 3.4  Engaging Abilities and Following Safety Precautions When a Person Can Sustain Actions on Objects     DESCRIPTION:    54% Cognitive Assistance: The person needs 24 hour nursing care to sequence throughout the routine steps of toileting, bathing, grooming, and dressing. 54% moderate cognitive assistance is required to go to the next step in self care activities. Individual preferences for repetitive actions that the person likes to do may be honored. 10% Physical Assistance for fine motor actions on all objects used in ADLs. Physical barriers or alarms to prevent getting lost and attempting to walk on anything other than flat surfaces without an escort. Put bed rails down to prevent attempts to climb over the top. Pain  No  Rating:  Location:  Pain Medicine Status: ? Denies need  ? Pain med requested  ? RN notified. Cognition    A&Ox person and date. Disoriented to place, situation, patient appropriate and cooperative. Follows multiple step commands inconsistently. Balance  Static Sitting:   Good   Dynamic Sitting:   Good   Static Standing:  Good   Dynamic Standing:  Good     Thera Activity  Pt.  Participated in education/discussion on self-soothing activities. Pt. Identified several ways to use her 5 senses to self-sooth. Pt. Was engaging and offered stories to go with activities. Exercise / Activities Initiated:   Pt. Educated on role of OT. Pt. Participated in:  ADL retraining. Assessment of Deficits:   Pt participated in education on coping skills. Pt. Pleasant, smiling and cooperative  Pt. participated well. Goal(s) : To be met in 3 Visits:  1). Pt. To complete interest check list.  (progressing 4/3/2019)    To be met in 5 Visits:  1). Pt. To complete ADM. 2). Pt. To demo independence to complete 3 grooming activities standing at sink. 3). Pt. To verbalize 3 coping skills.  (goal met 4/4/2019)  4). Pt. To verbalize understanding of sleep hygiene techniques. Plan:  Continue OT per POC.     Timed Code Treatment Minutes:   30 minutes    Total Treatment Time:  30  minutes    Signature and License Number    NASREEN Rodriguez/L  #[id number]        If patient discharges from this facility prior to next visit, this note will serve as the Discharge Summary

## 2019-04-04 NOTE — PROGRESS NOTES
Department of Psychiatry  Attending Progress Note    Admission Date:    3/30/2019    Chief complaint / Reason for Admission:  Psychosis, inability to care for self    Patient's chart was reviewed, case was discussed with nursing/OT/RT staff, and collaborated with  about the treatment plan. SUBJECTIVE:   Over last 24 hours:  Behavioral outbursts: No   Non-aggressive behavioral disturbance: No  Medication compliant: Partially  Need for seclusion/restraints: No  Sleeping adequately:  Yes  Appetite adequate: Yes  Attending groups: Yes    On today's interview:   Patient continues to voice intermittent paranoia on the unit but this has not been affecting her behavior. She has exhibited no exit seeking behavior and no aggression. Last night, her daughter spoke with her on the phone and was able to convince her to take some of her medications but not all. She did sleep well overnight. On interview, patient is again calm and cooperative. She is increasingly more accepting of the plan to transition to a nursing facility though her preference remains to return home. She did not argue the point today.     Suicidal ideation: Denies  Homicidal ideation: Denies  Medication side effects: No, patient has been largely refusing medication    ROS: Patient has new complaints: no    Current Medications Ordered:   vitamin B-12  1,000 mcg Oral Nightly    therapeutic multivitamin-minerals  1 tablet Oral Nightly    risperiDONE  0.5 mg Oral Nightly    melatonin  6 mg Oral Daily      PRN Meds: LORazepam, risperiDONE, traZODone, OLANZapine, acetaminophen, magnesium hydroxide     Objective:     PE:    /76   Pulse 75   Temp 97.3 °F (36.3 °C) (Oral)   Resp 15   Ht 5' 4\" (1.626 m)   Wt 116 lb 9.6 oz (52.9 kg)   SpO2 100%   BMI 20.01 kg/m²       Motor / Gait: no abnormalities noted, normal gait and station  AIMS: 0    Mental Status Examination:    Appearance: -American female, appears stated age,  in chair, wearing pajamas, Fair grooming and hygiene  Behavior/Attitude toward examiner:  Cooperative, calm  Speech:  Pressured, Hyperverbal  Mood:  \"I'm doing okay\"  Affect:  Euthymic  Thought processes: Tangential with loose associations  Thought Content: Denies SI, Denies HI, Continues to be paranoid but this appears to be softening she was less focus on this today  Perceptions: Denies AVH, Not actively RTIS  Attention: Distractible  Abstraction: Fletcher  Cognition: Average MARY, Alert and oriented to person, place, But not time or situation, recall Impaired  Insight: impaired insight   Judgment: impaired judgment      LAB: Reviewed labs from last 24 hours      Dx:   Primary Psychiatric (DSM V) Diagnosis: Major neurocognitive disorder due to multiple etiologies, with behavioral disturbance  Secondary Psychiatric (DSM V) Diagnoses: None  Chemical Dependency Diagnoses: None  Active Medical Diagnoses: Glaucoma, hypertension    All conditions detailed above are being treated while patient is hospitalized. Tx plan: Generally: prevent self injury, stabilize affect, restore sleep, treat depression, treat anxiety, establish/maintain aftercare, increase coping mechanisms, improve medication compliance. All conditions present on admission are being treated while pt is hospitalized. Discussed PHP after discharge as part of transition back to the community. Legal Status: Voluntary per guardian    1. Dementia with behavioral disturbance:  -Continue to offer melatonin 6 mg Q 2000  -Continue to offer risperidone 0.5 mg QHS. I did discuss this medication with her guardian including the black box warning. Consent obtained. -PRN risperidone 0.5 mg Q6 for agitation      3. Chemical Dependency Issues:  No issues. Function:  -Consult physical therapy  -Consult occupational therapy  -Falls precautions    Medical Problems:  Internal medicine has been consulted. Appreciate recs.     1. Continue to offer daily multivitamin and vitamin B 12. Code Status: Full code    Disposition:    -Housing: Own home. However patient is no longer safe to live in this environment alone. Home healthcare has been attempted and has failed due to her refusal to engage. The next safest level of care for her is a nursing facility which we are pursuing through her guardian at this time.  -Current outpatient follow-up: Primary care through [facility]  -Discharge planning is incomplete    Estimated length of stay: 5-7 days    Prognosis:  Guarded     Criteria for Discharge:  Not psychotic, no homicidal,  Not suicidal, sleeping well, affect stabl, eating well, aftercare arranged, able to be managed at a lower level of care. Total time with patient was 40 minutes and more than 50 % of that time was spent counseling the patient on their symptoms, treatment and expected goals.     Татьяна España MD  Staff Psychiatrist

## 2019-04-04 NOTE — PLAN OF CARE
Patient up on unit with steady gait noted. Denies SI/HI/AVH. Daughter called and was able to get patient to take medications as ordered. Patient had snack this evening. No injury or falls noted this day.   Denies pain or discomfort

## 2019-04-04 NOTE — BH NOTE
Writer met with John Dias with [facility] who reports that they can take this patient into their memory care unit, and that if she does well there they may consider stepping her down into assisted living. John Dias reports that guardian is taking a tour of their facility tomorrow. Writer informed John Dias that this patient will be psychiatrically stable and will be ready to discharge Saturday 4/6 .   Anticipated discharged Saturday 4/6

## 2019-04-04 NOTE — PLAN OF CARE
Problem: Altered Mood, Deterioration in Function:  Goal: Maintenance of adequate nutrition will improve  Description  Maintenance of adequate nutrition will improve  4/4/2019 1004 by Che Miller, RN  Outcome: Ongoing  Note:   Patient walking around the unit at the start of the shift. When nurse brought patient her breakfast tray she was suspicious stating \"I was told I wouldn't eat until 9. \" Picked at breakfast but ate less than 25%. Has been attending groups briefly so far this morning. Will continue to monitor.   4/3/2019 2153 by Lilli Marx RN  Outcome: Ongoing

## 2019-04-04 NOTE — GROUP NOTE
Group Therapy Note    Date: April 4    Group Start Time: 0900  Group End Time: 0930  Group Topic: Community Meeting  Number of Participants: [facility]      Group Therapy Note    Group Goal: to actively listen to unit guidelines and set a daily goal.     Notes:  Jason Lopez actively listened during [employer] and appropriately socialized with peers. Pt stated her goal for the day is \"to exercise and enjoy being around others. \"     Status After Intervention:  Improved    Participation Level:  Active Listener and Interactive    Participation Quality: Appropriate, Attentive, Sharing and Supportive      Speech:  normal      Thought Process/Content: Linear  Flight of ideas      Affective Functioning: Congruent      Mood: euthymic and tired      Level of consciousness:  Alert and Attentive      Response to Learning: Able to verbalize current knowledge/experience and Progressing to goal      Endings: None Reported    Modes of Intervention: Education, Support, Socialization, Clarifying and Problem-solving      Discipline Responsible: Psychoeducational Specialist      Signature:  Hernan Steward MT-BC

## 2019-04-04 NOTE — PROGRESS NOTES
Group Therapy Note    Date: 4/4/2019  Start Time: 11:00  End Time:  11:20  Number of Participants: 2     Type of Group: Healthy Living/Wellness    Notes:    Patient has a strong [place] davis which is a central part in her coping. This has sustained her all of her life. She was able to share her davis journey in the group and related to the other participant as connected in davis. Patient participated and openly shared. Appreciative of davis sharing.       Participation Level: good, appropriate    Participation Quality:  Active listener, interactive    Speech:  normal    Mood: Lights up when talking about Benjamín and her davis, euthymic    Level of consciousness:  Alert    Response to Learning: Able to verbalize current knowledge/experience and Capable of insight    Modes of Intervention: Spiritual support    Discipline Responsible: Spiritual Care      Signature:  Iis Díaz

## 2019-04-05 PROCEDURE — 1240000000 HC EMOTIONAL WELLNESS R&B

## 2019-04-05 PROCEDURE — 99232 SBSQ HOSP IP/OBS MODERATE 35: CPT | Performed by: PSYCHIATRY & NEUROLOGY

## 2019-04-05 PROCEDURE — 97530 THERAPEUTIC ACTIVITIES: CPT

## 2019-04-05 PROCEDURE — 6370000000 HC RX 637 (ALT 250 FOR IP): Performed by: PSYCHIATRY & NEUROLOGY

## 2019-04-05 RX ADMIN — MELATONIN 3 MG ORAL TABLET 6 MG: 3 TABLET ORAL at 20:18

## 2019-04-05 RX ADMIN — MULTIPLE VITAMINS W/ MINERALS TAB 1 TABLET: TAB at 20:17

## 2019-04-05 RX ADMIN — RISPERIDONE 0.5 MG: 0.5 TABLET, ORALLY DISINTEGRATING ORAL at 20:17

## 2019-04-05 RX ADMIN — CYANOCOBALAMIN TAB 500 MCG 1000 MCG: 500 TAB at 20:17

## 2019-04-05 ASSESSMENT — PAIN SCALES - GENERAL
PAINLEVEL_OUTOF10: 0
PAINLEVEL_OUTOF10: 0

## 2019-04-05 NOTE — PROGRESS NOTES
Nutrition Assessment    Type and Reason for Visit: Reassess    Nutrition Recommendations:   1. Continue general diet order - please record all meal intake % in flow sheets. 2. Continue Ensure Enlive with meals - please record all ONS intake % in flow sheets. 3. Monitor appetite, po intake, and mental status. 4. Please obtain actual, current weight for this patient since weight has not been updated since admission on 3/30/19.   5. Monitor nutrition-related labs and bowel function/regimen. Nutrition Assessment: patient is slightly improving from a nutritional standpoint AEB consuming 2 meals per day and one being % consumed on most days recently, however, she remains at risk for further compromise d/t compromised mental status/paranoia and resistance to medical interventions/medications; will continue general diet order and Ensure Enlive with meals     Malnutrition Assessment:  · Malnutrition Status: Meets the criteria for severe malnutrition  · Context: Chronic illness(vascular dementia with behavioral disturbance - worsening)  · Findings of the 6 clinical characteristics of malnutrition (Minimum of 2 out of 6 clinical characteristics is required to make the diagnosis of moderate or severe Protein Calorie Malnutrition based on AND/ASPEN Guidelines):  1. Energy Intake-Less than or equal to 75% of estimated energy requirement, Greater than or equal to 5 days    2. Weight Loss-Unable to assess, in 1 week  3. Fat Loss-Severe subcutaneous fat loss, Triceps, Orbital  4. Muscle Loss-Severe muscle mass loss, Temples (temporalis muscle), Scapula (trapezius), Clavicles (pectoralis and deltoids), Interosseous  5. Fluid Accumulation-No significant fluid accumulation,    6.  Strength-Not measured    Nutrition Risk Level:  Moderate    Nutrient Needs:  · Estimated Daily Total Kcal: 0740-3017(89-33PUKM/SQ)  · Estimated Daily Protein (g): 63-74(1.2-1.4g/kg)  · Estimated Daily Total Fluid (ml/day): 1500    Nutrition Diagnosis:   · Problem: Severe malnutrition  · Etiology: related to Cognitive or neurological impairment, Insufficient energy/nutrient consumption     Signs and symptoms:  as evidenced by Diet history of poor intake, Severe muscle loss, Severe loss of subcutaneous fat    Objective Information:  · Nutrition-Focused Physical Findings: patient participated in groups today and she was much calmer + cooperative; she is oriented to person mostly; + scattered bruising noted; last BM on 4/4 noted; ongoing intermittent paranoia noted but this seems to be improving  · Wound Type: None  · Current Nutrition Therapies:  · Oral Diet Orders: General   · Oral Diet intake: 1-25%, %  · Oral Nutrition Supplement (ONS) Orders: Standard High Calorie Oral Supplement  · ONS intake: Unable to assess  · Anthropometric Measures:  · Ht: 5' 4\" (162.6 cm)   · Current Body Wt: 116 lb 9 oz (52.9 kg)  · Admission Body Wt: 116 lb 9 oz (52.9 kg)  · Usual Body Wt: 120 lb (54.4 kg)  · Weight Change:  unable to assess; only one weight obtained x 6 days admission  · Ideal Body Wt: 120 lb (54.4 kg), % Ideal Body 97%  · BMI Classification: BMI 18.5 - 24.9 Normal Weight    Nutrition Interventions:   Continue current diet, Continue current ONS  Continued Inpatient Monitoring, Coordination of Care, Coordination of Community Care    Nutrition Evaluation:   · Evaluation: Progressing toward goals   · Goals: patient will continue to attempt consuming 75% or greater of at least 2 meals per day on general diet order + > 50% Ensure Enlive with meals; weight will remain stable or trend upward towards 120# during remainder of admission    · Monitoring: Meal Intake, Supplement Intake, Diet Tolerance, Skin Integrity, Mental Status/Confusion, Weight, Pertinent Labs, Monitor Bowel Function      Electronically signed by Luigi Deleon RD, LD on 4/5/19 at 3:20 PM    Contact Number: [phone]

## 2019-04-05 NOTE — BH NOTE
Time:2030       Type of Group: relaxation    Level of Participation: 50%    Comments:Pt ate snack in dining room but sitting at table alone with no socializing.

## 2019-04-05 NOTE — GROUP NOTE
Group Therapy Note    Date: April 5    Group Start Time: 1115  Group End Time: 1200  Group Topic: Psychoeducation  Number of Participants: [facility]      Group Therapy Note    Patient's Goal: to identify leisure skills and participate in group activity and discussion to foster development and use of leisure skills, improve mood regulation, and promote quality enjoyment of life. Notes:  Clifford Duque actively participated in group discussion and activity. Pt stated she enjoys \"reading my bible, going out with friends, eating out, and helping others. \"     Status After Intervention:  Improved    Participation Level:  Active Listener and Interactive    Participation Quality: Appropriate, Attentive and Sharing      Speech:  normal and quiet      Thought Process/Content: Linear      Affective Functioning: Congruent      Mood: euthymic      Level of consciousness:  Alert and Attentive      Response to Learning: Able to verbalize current knowledge/experience, Able to verbalize/acknowledge new learning, Capable of insight and Progressing to goal      Endings: None Reported    Modes of Intervention: Education, Support, Socialization, Exploration, Clarifying, Problem-solving, Activity and Media      Discipline Responsible: Psychoeducational Specialist      Signature:  TONI KamBC

## 2019-04-05 NOTE — PROGRESS NOTES
Occupational Therapy Daily Treatment Note    Unit: [facility]   Date:  4/5/2019  Patient Name:    Estrellita Leon  Admitting diagnosis:  Psychosis, unspecified psychosis type [facility]) [place] Date:  3/30/2019  Precautions/Restrictions:  Fall risk, general sid psych precautions     Discharge Recommendations: LTC/ ECF  DME needs for discharge:   defer to facility    AM-PAC Score: 18  Home Health S4 Level: NA       Treatment Time:  3327-4547  Treatment number:  4   Total Treatment Time:   25 minutes      Subjective:  Pt agreeable to work with therapy in the common area this date. Pt smiled and participated appropriately this date. Pain   No    Transfer Training:   Sit to stand:  Independent  Stand to sit: Independent  Ambulatory:  Independent     Therapeutic Activity:  Sleep Hygiene Tips and Patient Responses    Tip Currently Follows Tip Other Patient Responses    1. Don't go to bed unless you are sleepy. Yes    2. If you are not asleep after 20 minutes, then get out of the bed. Yes    3. Begin rituals that help you relax each night before bed. Yes    4. Get up at the same time every morning. Yes Between 6 and 7 AM   5. Get a full night's sleep on a regular basis.  ~ Depends on the night. Pt reports she does not get up frequently and will stay in bed throughout the night. 6. Avoid taking naps if you can. Yes ~ 20 min naps. Reports her rocking chair relaxes her   7. Keep a regular schedule.  ~ Pt reports it depends on the day. Sometimes she goes to the grocery store. 8. Don't read, write, eat, watch Tv, talk on phone, or play cards in bed. No Reads and watches Tv. Bible    9. Don't have caffeine after lunch. Yes    10. Do not have a beer, a glass of wine, or any other alcohol within 6 hours or your bedtime. Yes Does not drink. 11. Do not have a cigarette or any other source of nicotine before bedtime. Yes Does not smoke.     12. Do not go to bed hungry, but don't eat a big meal near bedtime either. Yes    13. Avoid any tough exercise within 6 hours of your bedtime. Yes    14. Avoid sleeping pills, or use them cautiously. Yes Used to take. Not anymore. 15. Try to get rid of or deal with things that make you worry. Yes \"Trust God\"      Pt attempted word search. Unable to complete with out maximal assistance to locate words. Patient Education: Role of OT ; Discharge Recommendations    Positioning Needs: Pt remaining in common area. Family Present:  No    Assessment: Pt with good progress. Pt able to participate in conversation about sleep hygiene techniques. Pt reports she is completing most of the tips at this time. Pt is very soft spoken and difficult to hear at times. Pt reports she is limited by sleeping at this time 2/2 being cold and the beds here. Pt reports her major concern at this time is her ability to care for washing her feet. Will continue to assess and assist pt toward this goal.     GOALS  To be met in 3 Visits:  1). Pt. To complete interest check list.  (progressing 4/3/2019)     To be met in 5 Visits:  1). Pt. To complete ADM. 2). Pt. To demo independence to complete 3 grooming activities standing at sink. 3). Pt. To verbalize 3 coping skills.  (goal met 4/4/2019)  4). Pt. To verbalize understanding of sleep hygiene techniques.  (goal met 4/5/19)   5).  Pt will demonstrate the ability to wash her feet independently. (goal added, 4/5/19)       Plan: cont with POC      License #  LUIS MIGUEL Nathan, OTR/L   [id number]       If patient discharges from this facility prior to next visit, this note will serve as the Discharge Summary

## 2019-04-05 NOTE — PLAN OF CARE
Nutrition Problem: Severe malnutrition  Intervention: Food and/or Nutrient Delivery: Continue current diet, Continue current ONS  Nutritional Goals: patient will continue to attempt consuming 75% or greater of at least 2 meals per day on general diet order + > 50% Ensure Enlive with meals; weight will remain stable or trend upward towards 120# during remainder of admission

## 2019-04-05 NOTE — PLAN OF CARE
Problem: Falls - Risk of:  Goal: Will remain free from falls  Description  Will remain free from falls  Outcome: Ongoing  Note:   Patient up on unit with steady gait noted. Denies pain or discomfort. Denies SI/HI/AVH. No falls or injury noted today       Problem: Altered Mood, Deterioration in Function:  Goal: Ability to perform activities of daily living will improve  Description  Ability to perform activities of daily living will improve  Outcome: Ongoing  Note:   Pt having some anxiety r/t possible discharge tomorrow, pt stating she wants to get all her belongings together now that it will be cold outside. Pt instructed that we will return all her belongings to her tomorrow at discharge. Pt voiced understanding.

## 2019-04-05 NOTE — PLAN OF CARE
Patient up on unit with steady gait noted. Did take medication with a lot of encouragement. Denies pain or discomfort. Alert to  name. Doesn't feel she needs medication. Denies SI/HI/AVH. Denies pain or discomfort. Did eat snack tonight. No falls or injury noted today.

## 2019-04-05 NOTE — GROUP NOTE
Group Therapy Note    Date: April 5    Group Start Time: 1000  Group End Time: 1045  Group Topic: Psychoeducation  Number of Participants: [facility]      Group Therapy Note    Patient Goal: to identify the meaning of self-esteem, identify good qualities in self, and participate in activity by writing these qualities down on \"Self-Esteem Rainbows\" and discussing with group to develop self-confidence, self-esteem, and improve overall feelings of self-worth. Notes:  Pérez Greco actively participated in group activity and discussion. Pt shared she is \"respectful and personable. I enjoy helping people and talking to people. \" Pérez Greco voiced understanding of the definitions of self-esteem and self-confidence and expressed enjoyment of activity. Status After Intervention:  Improved    Participation Level:  Active Listener and Interactive    Participation Quality: Appropriate, Attentive, Sharing and Supportive      Speech:  normal      Thought Process/Content: Linear      Affective Functioning: Congruent      Mood: euthymic and tired      Level of consciousness:  Attentive and Drowsy      Response to Learning: Able to verbalize current knowledge/experience, Able to verbalize/acknowledge new learning, Capable of insight and Progressing to goal      Endings: None Reported    Modes of Intervention: Education, Support, Socialization, Exploration, Clarifying, Problem-solving, Activity and Media      Discipline Responsible: Psychoeducational Specialist      Signature:  CHAYA Johnson

## 2019-04-06 PROBLEM — F29 PSYCHOSIS (HCC): Status: RESOLVED | Noted: 2019-03-29 | Resolved: 2019-04-06

## 2019-04-06 PROCEDURE — 99232 SBSQ HOSP IP/OBS MODERATE 35: CPT | Performed by: PSYCHIATRY & NEUROLOGY

## 2019-04-06 PROCEDURE — 1240000000 HC EMOTIONAL WELLNESS R&B

## 2019-04-06 RX ORDER — RISPERIDONE 0.5 MG/1
0.5 TABLET, FILM COATED ORAL NIGHTLY
Qty: 30 TABLET | Refills: 0
Start: 2019-04-06

## 2019-04-06 RX ORDER — LANOLIN ALCOHOL/MO/W.PET/CERES
6 CREAM (GRAM) TOPICAL NIGHTLY
Qty: 30 TABLET | Refills: 0
Start: 2019-04-06

## 2019-04-06 RX ORDER — TRAZODONE HYDROCHLORIDE 50 MG/1
25 TABLET ORAL NIGHTLY PRN
Qty: 30 TABLET | Refills: 0
Start: 2019-04-06

## 2019-04-06 NOTE — DISCHARGE SUMMARY
Geriatric Psychiatry Discharge Summary     Admit Date: 3/30/2019     Discharge Date:  4/10/2019      Discharge Diagnoses:  Primary Psychiatric (DSM V) Diagnosis: Major neurocognitive disorder due Vascular etiology, with behavioral disturbance  Secondary Psychiatric (DSM V) Diagnoses: None  Chemical Dependency Diagnoses: None  Active Medical Diagnoses: Glaucoma, hypertension    All psychiatric conditions and active medical problems above on were treated while patient was hospitalized. Disposition -  Nursing facility     Discharge Meds:    Current Discharge Medication List           Details   traZODone (DESYREL) 50 MG tablet Take 0.5 tablets by mouth nightly as needed for Sleep  Qty: 30 tablet, Refills: 0      risperiDONE (RISPERDAL) 0.5 MG tablet Take 1 tablet by mouth nightly  Qty: 30 tablet, Refills: 0      melatonin 3 MG TABS tablet Take 2 tablets by mouth nightly  Qty: 30 tablet, Refills: 0              Details   vitamin B-12 (CYANOCOBALAMIN) 1000 MCG tablet Take 1,000 mcg by mouth daily      ferrous sulfate 325 (65 FE) MG tablet Take 325 mg by mouth daily (with breakfast)      desloratadine (CLARINEX) 5 MG tablet One po qd  Qty: 30 tablet, Refills: 0      Multiple Vitamins-Minerals (MULTIVITAL) TABS Take 1 tablet by mouth 2 times daily             Multiple Neuroleptics? No    Reason for admission: Patient is an 80 y.o. female with history of vascular dementia who was admitted to the the older adult behavioral unit on 3/30/2019 for dementia related behavioral disturbance and inability to care for herself in the community. Patient met with and was treated by an interdisciplinary treatment team that included social work, occupational therapy, recreational therapy, nursing, and psychiatry. Patient was admitted on a Voluntary basis via her court-appointed guardian. Hospital Course:    1.  Vascular dementia with behavioral disturbance:  Patient was brought to the hospital for behavioral disturbance in the grooming and hygiene  Behavior/Attitude toward examiner:  Cooperative, calm  Speech:  Hyper-verbal and rambling, but interruptible   Mood:  \"I'm doing okay\"  Affect:  Euthymic  Thought processes: Tangential with loose associations  Thought Content: Denies SI, Denies HI, Continues to be paranoid but this appears to be softening she has been less focused on this lately, + Confabulation  Perceptions: Denies AVH, Not actively RTIS  Attention: Distractible  Abstraction: [place]  Cognition: Average MARY, Alert and oriented to person, place, But not time or situation, recall Impaired  Insight: impaired insight   Judgment: impaired judgment        Risk factor analysis:    Patient is a chronic safety risk due to severe dementia that is irreversible. This risk is being mitigated by transfer to a long-term care facility where she can have 24 hour supervision and nursing care. Condition on DC  Mood and affect are stable and pt is not suicidal, homicidal, or psychotic.     Follow Up:  See Discharge Instructions     Spent over 40 minutes with patient and staff on Yady Ruiz MD  Staff Psychiatrist

## 2019-04-06 NOTE — PLAN OF CARE
Pt. Is pleasant and cooperative, visible on the milieu, religiously preoccupied, denies pain, experiences paranoia of staff and treatment plan at times. Social with other patients, currently in dining room with staff no s/s of distress.

## 2019-04-06 NOTE — DISCHARGE INSTR - COC
Continuity of Care Form    Patient Name: Brent Milton   :  1931  MRN:  [id number]    Admit date:  3/30/2019  Discharge date:  ***    Code Status Order: Full Code   Advance Directives:   Advance Care Flowsheet Documentation     Date/Time Healthcare Directive Type of Healthcare Directive Copy in [address] Agent's Name Healthcare Agent's Phone Number    [phone]  No, patient does not have an advance directive for healthcare treatment -- -- -- -- --          Admitting Physician:  Rancho Valdez MD  PCP: Jimmie Isaac    Discharging Nurse: [facility] Unit/Room#: 2201/2201-01  Discharging Unit Phone Number: ***    Emergency Contact:   Extended Emergency Contact Information  Primary Emergency Contact: [facility] Phone: [phone]  Relation: Legal Guardian  Secondary Emergency Contact: [facility] Renetta Freeman Phone: [phone]  Relation: Child    Past Surgical History:  Past Surgical History:   Procedure Laterality Date    CATARACT REMOVAL      Glaucoma surgery as well    CATARACT REMOVAL WITH IMPLANT  2015    Glaucoma surgery as well    COLONOSCOPY      Unknown date    NOSE SURGERY         Immunization History: There is no immunization history for the selected administration types on file for this patient.     Active Problems:  Patient Active Problem List   Diagnosis Code    Glaucoma H40.9    Constipation K59.00    Anxious mood F41.9    Allergic rhinitis J30.9    Weight loss R63.4    Excessive ear wax H61.20    Elevated blood pressure reading without diagnosis of hypertension R03.0    Glaucoma of left eye H40.9    Essential hypertension I10    Normocytic anemia D64.9    Vascular dementia with behavior disturbance F01.51       Isolation/Infection:   Isolation          No Isolation            Nurse Assessment:  Last Vital Signs: /65   Pulse 91   Temp 97.7 °F (36.5 °C) (Oral)   Resp 16   Ht 5' 4\" (1.626 m)   Wt 116 lb 9.6 oz (52.9 kg)   SpO2 99%   BMI 20.01 kg/m²     Last documented pain score (0-10 scale): Pain Level: 0  Last Weight:   Wt Readings from Last 1 Encounters:   19 116 lb 9.6 oz (52.9 kg)     Mental Status:  {IP PT MENTAL STATUS:}    IV Access:  { MARY ANN IV ACCESS:728639378}    Nursing Mobility/ADLs:  Walking   {CHP DME ZMZY:338099524}  Transfer  {CHP DME CKCF:729897533}  Bathing  {CHP DME ZQHQ:305247236}  Dressing  {CHP DME MCLS:921621478}  Toileting  {CHP DME QNV}  Feeding  {CHP DME DMBB:840213801}  Med Admin  {CHP DME COTV:791629359}  Med Delivery   { MARY ANN MED Delivery:094483834}    Wound Care Documentation and Therapy:        Elimination:  Continence:   · Bowel: {YES / WB:85209}  · Bladder: {YES / VS:75144}  Urinary Catheter: {Urinary Catheter:652138467}   Colostomy/Ileostomy/Ileal Conduit: {YES / PY:24858}       Date of Last BM: ***    Intake/Output Summary (Last 24 hours) at 2019 1100  Last data filed at 2019 2120  Gross per 24 hour   Intake 120 ml   Output --   Net 120 ml     I/O last 3 completed shifts:   In: 120 [P.O.:120]  Out: -     Safety Concerns:     [address] Safety Concerns:017603793}    Impairments/Disabilities:      [address] Impairments/Disabilities:221496028}    Nutrition Therapy:  Current Nutrition Therapy:   [address] Diet List:284447155}    Routes of Feeding: {P DME Other Feedings:877071568}  Liquids: {Slp liquid thickness:49623}  Daily Fluid Restriction: {CHP DME Yes amt example:909388048}  Last Modified Barium Swallow with Video (Video Swallowing Test): {Done Not Done IAXZ:380287211}    Treatments at the Time of Hospital Discharge:   Respiratory Treatments: ***  Oxygen Therapy:  {Therapy; copd oxygen:42350}  Ventilator:    { CC Vent YRZT:330311725}    Rehab Therapies: {THERAPEUTIC INTERVENTION:2454262604}  Weight Bearing Status/Restrictions: [address] Weight Bearin}  Other Medical Equipment (for information only, NOT a DME order): {EQUIPMENT:514599210}  Other Treatments: ***    Patient's personal belongings (please select all that are sent with patient):  {CHP DME Belongings:896269898}    RN SIGNATURE:  {Esignature:334305675}    CASE MANAGEMENT/SOCIAL WORK SECTION    Inpatient Status Date: ***    Readmission Risk Assessment Score:  Readmission Risk              Risk of Unplanned Readmission:        13           Discharging to Facility/ Agency   · Name:   · Address:  · Phone:  · Fax:    Dialysis Facility (if applicable)   · Name:  · Address:  · Dialysis Schedule:  · Phone:  · Fax:    / signature: {Esignature:804515227}    PHYSICIAN SECTION    Prognosis: {Prognosis:2127416534}    Condition at Discharge: [address] [place] [address] Patient Condition:842347885}    Rehab Potential (if transferring to Rehab): {Prognosis:6051700126}    Recommended Labs or Other Treatments After Discharge: ***    Physician Certification: I certify the above information and transfer of Bonilla Gill  is necessary for the continuing treatment of the diagnosis listed and that she requires {Admit to Appropriate Level of Care:20142} for {GREATER/LESS:126088399} 30 days.      Update Admission H&P: {CHP DME Changes in KHWZT:371370639}    PHYSICIAN SIGNATURE:  {Esignature:978886002}

## 2019-04-06 NOTE — PROGRESS NOTES
Department of Psychiatry  Attending Progress Note    Admission Date:    3/30/2019    Chief complaint / Reason for Admission:  Psychosis, inability to care for self    Patient's chart was reviewed, case was discussed with nursing/OT/RT staff, and collaborated with  about the treatment plan. SUBJECTIVE:   Over last 24 hours:  Behavioral outbursts: No   Non-aggressive behavioral disturbance: No  Medication compliant: Yes  Need for seclusion/restraints: No  Sleeping adequately:  Yes  Appetite adequate: Yes  Attending groups: Yes    On today's interview: We were able to convince patient to take her medication last night. She continues to intermittently voice paranoia and confabulation. She continues to be verbally redirected ball from these ideas, however. On interview patient was oriented to self only. Much of what she said today was confabulation. She was mildly anxious about the concerns that she was confabulating. She was able to be redirected.     Suicidal ideation: Denies  Homicidal ideation: Denies  Medication side effects: No    ROS: Patient has new complaints: no    Current Medications Ordered:   vitamin B-12  1,000 mcg Oral Nightly    therapeutic multivitamin-minerals  1 tablet Oral Nightly    risperiDONE  0.5 mg Oral Nightly    melatonin  6 mg Oral Daily      PRN Meds: LORazepam, risperiDONE, traZODone, OLANZapine, acetaminophen, magnesium hydroxide     Objective:     PE:    /71   Pulse 80   Temp 97.5 °F (36.4 °C) (Oral)   Resp 16   Ht 5' 4\" (1.626 m)   Wt 116 lb 9.6 oz (52.9 kg)   SpO2 99%   BMI 20.01 kg/m²       Motor / Gait: no abnormalities noted, normal gait and station  AIMS: 0    Mental Status Examination:    Appearance: -American female, appears stated age,  in chair, wearing pajamas, Fair grooming and hygiene  Behavior/Attitude toward examiner:  Cooperative, calm  Speech:  Pressured, Hyperverbal  Mood:  \"I'm doing okay\"  Affect:  Euthymic  Thought processes: Tangential with loose associations  Thought Content: Denies SI, Denies HI, Continues to be paranoid but this appears to be softening she was less focus on this today  Perceptions: Denies AVH, Not actively RTIS  Attention: Distractible  Abstraction: [place]  Cognition: Average MARY, Alert and oriented to person, place, But not time or situation, recall Impaired  Insight: impaired insight   Judgment: impaired judgment      LAB: Reviewed labs from last 24 hours      Dx:   Primary Psychiatric (DSM V) Diagnosis: Major neurocognitive disorder due to multiple etiologies, with behavioral disturbance  Secondary Psychiatric (DSM V) Diagnoses: None  Chemical Dependency Diagnoses: None  Active Medical Diagnoses: Glaucoma, hypertension    All conditions detailed above are being treated while patient is hospitalized. Tx plan: Generally: prevent self injury, stabilize affect, restore sleep, treat depression, treat anxiety, establish/maintain aftercare, increase coping mechanisms, improve medication compliance. All conditions present on admission are being treated while pt is hospitalized. Discussed PHP after discharge as part of transition back to the community. Legal Status: Voluntary per guardian    1. Dementia with behavioral disturbance:  -Continue to offer melatonin 6 mg Q 2000  -Continue to offer risperidone 0.5 mg QHS. I did discuss this medication with her guardian including the black box warning. Consent obtained. -PRN risperidone 0.5 mg Q6 for agitation      3. Chemical Dependency Issues:  No issues. Function:  -Consult physical therapy  -Consult occupational therapy  -Falls precautions    Medical Problems:  Internal medicine has been consulted. Appreciate recs. 1. Continue to offer daily multivitamin and vitamin B 12.     Code Status: Full code    Disposition:    -Housing: Disposition to nursing home tomorrow.  -Current outpatient follow-up: Primary care through [facility]  -Discharge planning is incomplete    Estimated length of stay: Discharged tomorrow. Prognosis:  Guarded     Criteria for Discharge:  Not psychotic, no homicidal,  Not suicidal, sleeping well, affect stabl, eating well, aftercare arranged, able to be managed at a lower level of care. Total time with patient was 40 minutes and more than 50 % of that time was spent counseling the patient on their symptoms, treatment and expected goals.     Julieth Solis MD  Staff Psychiatrist

## 2019-04-06 NOTE — PROGRESS NOTES
12.    Code Status: Full code    Disposition:    -Housing: Disposition to nursing home tomorrow.  -Current outpatient follow-up: Primary care through [facility]  -Discharge planning is incomplete    Estimated length of stay: Discharged tomorrow. Prognosis:  Guarded     Criteria for Discharge:  Not psychotic, no homicidal,  Not suicidal, sleeping well, affect stabl, eating well, aftercare arranged, able to be managed at a lower level of care. Total time with patient was 40 minutes and more than 50 % of that time was spent counseling the patient on their symptoms, treatment and expected goals.     Dasha Kaplan MD  Staff Psychiatrist

## 2019-04-07 PROCEDURE — 6370000000 HC RX 637 (ALT 250 FOR IP): Performed by: PSYCHIATRY & NEUROLOGY

## 2019-04-07 PROCEDURE — 1240000000 HC EMOTIONAL WELLNESS R&B

## 2019-04-07 RX ADMIN — RISPERIDONE 0.5 MG: 0.5 TABLET, ORALLY DISINTEGRATING ORAL at 19:44

## 2019-04-07 RX ADMIN — MULTIPLE VITAMINS W/ MINERALS TAB 1 TABLET: TAB at 19:44

## 2019-04-07 RX ADMIN — CYANOCOBALAMIN TAB 500 MCG 1000 MCG: 500 TAB at 19:44

## 2019-04-07 RX ADMIN — MELATONIN 3 MG ORAL TABLET 6 MG: 3 TABLET ORAL at 19:44

## 2019-04-07 RX ADMIN — TRAZODONE HYDROCHLORIDE 25 MG: 50 TABLET ORAL at 19:44

## 2019-04-07 ASSESSMENT — PAIN SCALES - GENERAL
PAINLEVEL_OUTOF10: 0
PAINLEVEL_OUTOF10: 0

## 2019-04-07 NOTE — PLAN OF CARE
Patient sitting in dining room with co patients. Religiously preoccupied singing hymns and trying to talk about L[employer] with others. Alert to name and place. Refused all medication after numerous attempts. States \" I don't need those pills. Janet Farmer will take care of me\". Refused snack. Denies pain or discomfort. Ambulates with steady gait. No falls or injury this day.

## 2019-04-07 NOTE — PLAN OF CARE
Problem: Falls - Risk of:  Goal: Absence of physical injury  Description  Absence of physical injury  Outcome: Ongoing     Problem: Altered Mood, Deterioration in Function:  Goal: Ability to perform activities of daily living will improve  Description  Ability to perform activities of daily living will improve  Outcome: Ongoing   Pt remains free from physical injury at this time. Pt visible and social in dayroom, pt appearance is clean and well groomed.

## 2019-04-08 ENCOUNTER — APPOINTMENT (OUTPATIENT)
Dept: GENERAL RADIOLOGY | Age: 84
DRG: 884 | End: 2019-04-08
Attending: PSYCHIATRY & NEUROLOGY
Payer: MEDICARE

## 2019-04-08 PROCEDURE — 71046 X-RAY EXAM CHEST 2 VIEWS: CPT

## 2019-04-08 PROCEDURE — 1240000000 HC EMOTIONAL WELLNESS R&B

## 2019-04-08 PROCEDURE — 99232 SBSQ HOSP IP/OBS MODERATE 35: CPT | Performed by: PSYCHIATRY & NEUROLOGY

## 2019-04-08 PROCEDURE — 6370000000 HC RX 637 (ALT 250 FOR IP): Performed by: PSYCHIATRY & NEUROLOGY

## 2019-04-08 RX ADMIN — CYANOCOBALAMIN TAB 500 MCG 1000 MCG: 500 TAB at 20:25

## 2019-04-08 RX ADMIN — RISPERIDONE 0.5 MG: 0.5 TABLET, ORALLY DISINTEGRATING ORAL at 20:25

## 2019-04-08 RX ADMIN — MELATONIN 3 MG ORAL TABLET 6 MG: 3 TABLET ORAL at 20:25

## 2019-04-08 RX ADMIN — TRAZODONE HYDROCHLORIDE 25 MG: 50 TABLET ORAL at 20:25

## 2019-04-08 RX ADMIN — MULTIPLE VITAMINS W/ MINERALS TAB 1 TABLET: TAB at 20:25

## 2019-04-08 NOTE — PLAN OF CARE
Problem: Altered Mood, Deterioration in Function:  Goal: Ability to perform activities of daily living will improve  Description  Ability to perform activities of daily living will improve  Outcome: Ongoing  Goal: Able to verbalize reality based thinking  Description  Able to verbalize reality based thinking  Outcome: Ongoing  Note:   Pt continues to be paranoid and religiously preoccupied. Pt calm, pleasant, and cooperative. Increased isolation during shift spending more time in her room and less social. Still visible on unit and attending groups. Will continue to monitor.   Goal: Maintenance of adequate nutrition will improve  Description  Maintenance of adequate nutrition will improve  Outcome: Ongoing

## 2019-04-08 NOTE — PROGRESS NOTES
Department of Psychiatry  Attending Progress Note    Admission Date:    3/30/2019    Chief complaint / Reason for Admission:  Psychosis, inability to care for self    Patient's chart was reviewed, case was discussed with nursing/OT/RT staff, and collaborated with  about the treatment plan. SUBJECTIVE:   Over last 24 hours:  Behavioral outbursts: No   Non-aggressive behavioral disturbance: No  Medication compliant: Yes  Need for seclusion/restraints: No  Sleeping adequately:  Yes  Appetite adequate: Yes  Attending groups: Yes    On today's interview:   No significant management issues. Pt was medication compliant. Eating well, going to most groups. Was supposed to go to SNF over weekend, but guardian was slow in getting paperwork completed with them. Discharge now planned for Wednesday.     Suicidal ideation: Denies  Homicidal ideation: Denies  Medication side effects: No    ROS: Patient has new complaints: no    Current Medications Ordered:   vitamin B-12  1,000 mcg Oral Nightly    therapeutic multivitamin-minerals  1 tablet Oral Nightly    risperiDONE  0.5 mg Oral Nightly    melatonin  6 mg Oral Daily      PRN Meds: LORazepam, risperiDONE, traZODone, OLANZapine, acetaminophen, magnesium hydroxide     Objective:     PE:    /69   Pulse 90   Temp 97 °F (36.1 °C) (Oral)   Resp 18   Ht 5' 4\" (1.626 m)   Wt 116 lb 9.6 oz (52.9 kg)   SpO2 95%   BMI 20.01 kg/m²       Motor / Gait: no abnormalities noted, normal gait and station  AIMS: 0    Mental Status Examination:    Appearance: -American female, appears stated age,  in chair, wearing pajamas, fair grooming and hygiene  Behavior/Attitude toward examiner:  Cooperative, calm  Speech:  Hyper-verbal and rambling, but interruptible   Mood:  \"I'm doing okay\"  Affect:  Euthymic  Thought processes: Tangential with loose associations  Thought Content: Denies SI, Denies HI, Continues to be paranoid but this appears to be softening she has been less focused on this lately, + Confabulation  Perceptions: Denies AVH, Not actively RTIS  Attention: Distractible  Abstraction: [place]  Cognition: Average MARY, Alert and oriented to person, place, But not time or situation, recall Impaired  Insight: impaired insight   Judgment: impaired judgment      LAB: Reviewed labs from last 24 hours      Dx:   Primary Psychiatric (DSM V) Diagnosis: Major neurocognitive disorder due to multiple etiologies, with behavioral disturbance  Secondary Psychiatric (DSM V) Diagnoses: None  Chemical Dependency Diagnoses: None  Active Medical Diagnoses: Glaucoma, hypertension    All conditions detailed above are being treated while patient is hospitalized. Tx plan: Generally: prevent self injury, stabilize affect, restore sleep, treat depression, treat anxiety, establish/maintain aftercare, increase coping mechanisms, improve medication compliance. All conditions present on admission are being treated while pt is hospitalized. Discussed PHP after discharge as part of transition back to the community. Legal Status: Voluntary per guardian    1. Dementia with behavioral disturbance:  -Continue melatonin 6 mg Q 2000  -Continue risperidone 0.5 mg QHS. I did discuss this medication with her guardian including the black box warning. Consent obtained. -PRN risperidone 0.5 mg Q6 for agitation      3. Chemical Dependency Issues:  No issues. Function:  -Consult physical therapy  -Consult occupational therapy  -Falls precautions    Medical Problems:  Internal medicine has been consulted. Appreciate recs. 1. Continue to offer daily multivitamin and vitamin B 12. Code Status: Full code    Disposition:    -Housing: Disposition to nursing home Wednesday.  -Current outpatient follow-up: Primary care through [facility]  -Discharge planning is incomplete    Estimated length of stay: Discharged Wednesday.     Prognosis:  Guarded     Criteria for Discharge:  Not psychotic, no homicidal,

## 2019-04-08 NOTE — GROUP NOTE
Group Therapy Note     Date: April 8     Group Start Time: 1345  Group End Time: 1500  Group Topic: Recreational     [facility]    Zeina Gilbert           Group Therapy Note     Attendees: 6        Patient's Goal:  Patients were invited to participate in 1:45 pm Recreation / Art Therapy Group on the topic of identifying their social support groups. Patients received a piece of paper with a picture frame gallery and were invited to fill the frames with the important people and places in their lives. Patients were encouraged to share and process their work with the group. Notes:  Adriano Friday was active in art making and identified that it was important for her \"for young people and old people to work together. That's when we can make wonderful things. \" Adriano Friday was pleasant and cooperative, with tangential speech and loose association. Status After Intervention:  Unchanged    Participation Level:  Active Listener and Interactive    Participation Quality: Appropriate, Attentive, Sharing and Supportive      Speech:  normal      Thought Process/Content: Flight of ideas  Perseverating      Affective Functioning: Congruent      Mood: euthymic      Level of consciousness:  Alert and Attentive      Response to Learning: Able to verbalize current knowledge/experience, Able to verbalize/acknowledge new learning and Able to retain information      Endings: None Reported    Modes of Intervention: Education, Support, Socialization and Exploration      Discipline Responsible: Psychoeducational Specialist      Signature:  Zeina Weiss [facility]

## 2019-04-08 NOTE — GROUP NOTE
Group Therapy Note    Date: April 8    Group Start Time: 0900  Group End Time: 5380  Group Topic: Community Meeting  Participants: [address] [place] [address]      Group Therapy Note    Patient Goal: to actively listen to community guidelines and set a daily goal.     Notes: Nelsy Snell actively listened to community guidelines. Nelsy Snell shared her daily go is \"to get a little bit stronger. \"    Status After Intervention:  Improved    Participation Level:  Active Listener and Interactive    Participation Quality: Appropriate, Attentive and Sharing      Speech:  normal      Thought Process/Content: Flight of ideas      Affective Functioning: Congruent      Mood: euthymic      Level of consciousness:  Alert and Attentive      Response to Learning: Capable of insight      Endings: None Reported    Modes of Intervention: Education, Support, Socialization, Exploration, Clarifying, Problem-solving and Activity      Discipline Responsible: Psychoeducational Specialist      Signature:  Amrit Cannon

## 2019-04-08 NOTE — GROUP NOTE
Group Therapy Note    Date: April 8    Group Start Time: 1115  Group End Time: 8742  Group Topic: Cognitive Skills  Participants: [address]        Group Therapy Note    Patient Goal: to solve bingo clues to improve cognitive skills. Notes: Karin engaged in using cognitive skills and solved multiple bingo clues. Guadelupe Certain socialized with peers about [employer] Star[employer] traditions. Karin shared her [place] Star[employer] traditions are \"to go to [employer], dress my girls up in dresses and hats, and do relay races with the kids. \"     Status After Intervention:  Improved    Participation Level:  Active Listener and Interactive    Participation Quality: Appropriate, Attentive and Sharing      Speech:  normal      Thought Process/Content: Flight of ideas      Affective Functioning: Congruent      Mood: euthymic      Level of consciousness:  Alert and Attentive      Response to Learning: Capable of insight and Able to change behavior      Endings: None Reported    Modes of Intervention: Education, Support, Socialization, Exploration, Clarifying, Problem-solving and Activity      Discipline Responsible: Psychoeducational Specialist      Signature:  Krista Ramirez, [address]

## 2019-04-09 PROCEDURE — 6370000000 HC RX 637 (ALT 250 FOR IP): Performed by: PSYCHIATRY & NEUROLOGY

## 2019-04-09 PROCEDURE — 1240000000 HC EMOTIONAL WELLNESS R&B

## 2019-04-09 RX ADMIN — CYANOCOBALAMIN TAB 500 MCG 1000 MCG: 500 TAB at 21:03

## 2019-04-09 RX ADMIN — TRAZODONE HYDROCHLORIDE 25 MG: 50 TABLET ORAL at 21:03

## 2019-04-09 RX ADMIN — MELATONIN 3 MG ORAL TABLET 6 MG: 3 TABLET ORAL at 21:03

## 2019-04-09 RX ADMIN — MULTIPLE VITAMINS W/ MINERALS TAB 1 TABLET: TAB at 21:03

## 2019-04-09 RX ADMIN — RISPERIDONE 0.5 MG: 0.5 TABLET, ORALLY DISINTEGRATING ORAL at 21:03

## 2019-04-09 NOTE — GROUP NOTE
Group Therapy Note    Date: April 9    Group Start Time: 1120  Group End Time: 1200  Group Topic: Psychoeducation    [facility]        Group Therapy Note    Attendees: 7    Patient's Goal: to identify emotions felt while listening to songs and participate in group activity and discussion to practice emotional expression, manage mood, and promote overall quality of life. Notes:  Rosa Tracy actively participated in group activity and group discussion. Pt identified emotions she felt during each song and discussed that emotion with the group. Rosa Tracy shared that she feels \"different variations on the song make me feel different ways. \" CHAYA explained to pt why that was and Karin expressed understanding. Status After Intervention:  Improved    Participation Level:  Active Listener and Interactive    Participation Quality: Appropriate, Attentive, Sharing and Supportive      Speech:  Normal, quiet      Thought Process/Content: Linear      Affective Functioning: Congruent      Mood: euthymic      Level of consciousness:  Alert and Attentive      Response to Learning: Able to verbalize current knowledge/experience, Able to verbalize/acknowledge new learning and Progressing to goal      Endings: None Reported    Modes of Intervention: Education, Support, Socialization, Exploration, Clarifying, Problem-solving, Activity and Media      Discipline Responsible: Psychoeducational Specialist      Signature:  CHAYA Pacheco

## 2019-04-09 NOTE — GROUP NOTE
Group Therapy Note    Date: April 9    Group Start Time: 7225  Group End Time: 1430  Group Topic: Recreational    [facility]        Group Therapy Note    Attendees: 4    Patient Goal: To exercise, stretch, dance, and play a social game to improve mood and decrease stress. Notes: Neelam Kj encouraged multiple peers to participate in group. Karin's mood presented to improve throughout group. Neelam Underwood was observed singing and dancing in group. Ariannapetey Underwood socialized with peers about Latter-day and appeared to perseverate on it, as evident by relating every group topic to L[employer]-day. Ariannapetey Kj stated to staff after group, \"I feel great! \"     Status After Intervention:  Improved    Participation Level:  Active Listener and Interactive    Participation Quality: Appropriate, Attentive and Sharing      Speech:  normal      Thought Process/Content: Flight of ideas  Perseverating      Affective Functioning: Congruent      Mood: elevated and bright      Level of consciousness:  Alert and Attentive      Response to Learning: Capable of insight and Able to change behavior      Endings: None Reported    Modes of Intervention: Education, Support, Socialization, Exploration, Clarifying, Problem-solving, Activity and Movement      Discipline Responsible: Psychoeducational Specialist      Signature:  OBI Mahajan MT-BC

## 2019-04-09 NOTE — GROUP NOTE
Group Therapy Note    Date: April 9    Group Start Time: 0900  Group End Time: 0920  Group Topic: [address]        Group Therapy Note    Attendees: 6    Patient Goal: to actively listen to unit guidelines and set a daily goal.      Notes:  Amador Thomas spoke with RN staff for the majority of [employer]. Pt stated her goal for the day is \"to get some clean clothes. \"     Status After Intervention:  Unchanged    Participation Level:  Active Listener    Participation Quality: Appropriate      Speech:  normal      Thought Process/Content: Linear      Affective Functioning: Congruent      Mood: anxious      Level of consciousness:  Alert      Response to Learning: Able to change behavior and Progressing to goal      Endings: None Reported    Modes of Intervention: Education, Support, Exploration, Clarifying and Problem-solving      Discipline Responsible: Psychoeducational Specialist      Signature:  CHAYA Patino

## 2019-04-09 NOTE — PLAN OF CARE
Goal: Able to verbalize reality based thinking  Description  Able to verbalize reality based thinking  4/9/2019 1132 by Jim Harry RN  Outcome: Ongoing  Note:   Pt continues to have tangential speech manipulating most conversations towards [employer]ism. Paranoid about meds and vitals. Pt stated that the pulse ox would give her medicine through it if it was put on her finger. Writer educated pt on what a pulse ox is used for and demonstrated on self. Pt was compliant w/ this reassurance.

## 2019-04-09 NOTE — GROUP NOTE
Group Therapy Note    Date: April 9    Group Start Time: 1005  Group End Time: 3681  Group Topic: Psychoeducation  Participants: [address]        Group Therapy Note    Patient Goal: Pt was provided with art materials. Pt was instructed to trace hands on paper and write or draw pictures of goals they would like to work on in one hand and in the other hand write or draw pictures of things that will help them achieve their goals to improve quality of life. Notes: Karin required moderate encouragement to participate in group activity. Craig Hernandez shared with peers her goal is \"to continue reading devotions and share my davis. \" Craig Hernandez shared with peers \"trust, love, kindness, and davis\" are going to assist her in achieving her goal. Craig Hernandez was observed appropriately socializing with peers in group with tangential speech and loose association. Status After Intervention:  Improved    Participation Level:  Active Listener and Interactive    Participation Quality: Appropriate, Attentive and Sharing      Speech:  normal      Thought Process/Content: Flight of ideas      Affective Functioning: Congruent      Mood: euthymic      Level of consciousness:  Alert and Attentive      Response to Learning: Capable of insight and Able to change behavior      Endings: None Reported    Modes of Intervention: Education, Support, Socialization, Exploration, Clarifying, Problem-solving and Activity      Discipline Responsible: Psychoeducational Specialist      Signature:  Amrit Bailey

## 2019-04-09 NOTE — PLAN OF CARE
Patient isolating to room. Pleasant and cooperative. Takes medication as ordered. Denies SI/HI/AVH. Denies pain or discomfort as well as further needs. No injury noted this shift.

## 2019-04-10 VITALS
HEART RATE: 86 BPM | OXYGEN SATURATION: 100 % | RESPIRATION RATE: 16 BRPM | TEMPERATURE: 96.9 F | WEIGHT: 116.6 LBS | SYSTOLIC BLOOD PRESSURE: 126 MMHG | BODY MASS INDEX: 19.91 KG/M2 | HEIGHT: 64 IN | DIASTOLIC BLOOD PRESSURE: 76 MMHG

## 2019-04-10 PROCEDURE — 99239 HOSP IP/OBS DSCHRG MGMT >30: CPT | Performed by: PSYCHIATRY & NEUROLOGY

## 2019-04-10 PROCEDURE — 99999 PR OFFICE/OUTPT VISIT,PROCEDURE ONLY: CPT | Performed by: PSYCHIATRY & NEUROLOGY

## 2019-04-10 NOTE — BH NOTE
Time:2100      Type of Group: relaxation      Level of Participation:0%      Comments: Pt in bed sleeping

## 2019-04-10 NOTE — GROUP NOTE
Group Therapy Note    Date: April 10    Group Start Time: 1120  Group End Time: 1200  Group Topic: Psychoeducation    [facility]        Group Therapy Note    Attendees: 4    Patient's Goal: to participate in group activity and discussion and identify personal strengths to promote self-confidence, self-esteem, and overall quality of life. Notes:  Handy Elver actively participated in group activity. When prompted to participate in discussion, pt would begin with religiously based content and required redirection back to the topic of the music that had been played. Karin responded well to redirections and participated in discussion appropriately. Pt originally wrote the color of her marker and \"Bible\" multiple times on her strengths sheet. With assistance, Handy Raya identified her strengths are \"hope, drawing, and loving my kids. \"     Status After Intervention:  Improved    Participation Level:  Active Listener and Interactive    Participation Quality: Appropriate, Attentive, Sharing and Supportive      Speech:  normal      Thought Process/Content: Linear  Perseverating      Affective Functioning: Congruent      Mood: euthymic      Level of consciousness:  Alert and Attentive      Response to Learning: Able to verbalize current knowledge/experience and Progressing to goal      Endings: None Reported    Modes of Intervention: Education, Support, Socialization, Exploration, Clarifying, Problem-solving, Activity and Media      Discipline Responsible: Psychoeducational Specialist      Signature:  CHAYA Delgadillo

## 2019-04-10 NOTE — GROUP NOTE
Group Therapy Note    Date: April 10    Group Start Time: 1412  Group End Time: 0915  Group Topic: [address] [place] [address]        Group Therapy Note    Attendees: 6    Patient Goal: To actively listen to unit guidelines and set a daily goal.     Notes: Sofía Medina actively listened to unit guidelines. Sofía Medina shared her daily goal is \"to get a fresh start. \"    Status After Intervention:  Improved    Participation Level:  Active Listener and Interactive    Participation Quality: Appropriate, Attentive and Sharing      Speech:  normal      Thought Process/Content: Flight of ideas      Affective Functioning: Congruent      Mood: euthymic      Level of consciousness:  Alert and Attentive      Response to Learning: Capable of insight and Able to change behavior      Endings: None Reported    Modes of Intervention: Education, Support, Socialization, Exploration, Clarifying, Problem-solving and Activity      Discipline Responsible: Psychoeducational Specialist      Signature:  Amrit Lewis

## 2019-04-10 NOTE — PROGRESS NOTES
Department of Psychiatry  Attending Progress Note    Admission Date:    3/30/2019    Chief complaint / Reason for Admission:  Psychosis, inability to care for self    Patient's chart was reviewed, case was discussed with nursing/OT/RT staff, and collaborated with  about the treatment plan. SUBJECTIVE:   Over last 24 hours:  Behavioral outbursts: No   Non-aggressive behavioral disturbance: No  Medication compliant: Yes  Need for seclusion/restraints: No  Sleeping adequately:  Yes  Appetite adequate: Yes  Attending groups: Yes    On today's interview:   No significant management issues. Pt was medication compliant. Eating well, going to most groups. Was supposed to go to SNF over weekend, but guardian was slow in getting paperwork completed with them. Discharge now planned for Wednesday.     Suicidal ideation: Denies  Homicidal ideation: Denies  Medication side effects: No    ROS: Patient has new complaints: no    Current Medications Ordered:   vitamin B-12  1,000 mcg Oral Nightly    therapeutic multivitamin-minerals  1 tablet Oral Nightly    risperiDONE  0.5 mg Oral Nightly    melatonin  6 mg Oral Daily      PRN Meds: LORazepam, risperiDONE, traZODone, OLANZapine, acetaminophen, magnesium hydroxide     Objective:     PE:    /64   Pulse 74   Temp 97.8 °F (36.6 °C) (Oral)   Resp 16   Ht 5' 4\" (1.626 m)   Wt 116 lb 9.6 oz (52.9 kg)   SpO2 99%   BMI 20.01 kg/m²       Motor / Gait: no abnormalities noted, normal gait and station  AIMS: 0    Mental Status Examination:    Appearance: -American female, appears stated age,  in chair, wearing pajamas, fair grooming and hygiene  Behavior/Attitude toward examiner:  Cooperative, calm  Speech:  Hyper-verbal and rambling, but interruptible   Mood:  \"I'm doing okay\"  Affect:  Euthymic  Thought processes: Tangential with loose associations  Thought Content: Denies SI, Denies HI, Continues to be paranoid but this appears to be softening she

## 2019-04-10 NOTE — GROUP NOTE
Group Therapy Note    Date: April 10    Group Start Time: 4483  Group End Time: 1435  Group Topic: Recreational    [facility]        Group Therapy Note    Attendees: 7    Patient's Goal: to participate in group activity, Music Bingo, and socialize with peers to promote relationship building, stimulate memory, and improve overall quality of life. Notes:  Jyoti Rocha actively participated in group activity, demonstrated by being observed singing along, dancing, and naming the songs/artists that were playing. Pt required minimal assistance with activity. Jyoti Rocha appropriately socialized with peers and shared memories associated with songs. Status After Intervention:  Improved    Participation Level:  Active Listener and Interactive    Participation Quality: Appropriate, Attentive, Sharing and Supportive      Speech:  normal      Thought Process/Content: linear (with assistance)      Affective Functioning: Congruent      Mood: euthymic      Level of consciousness:  Alert and Attentive      Response to Learning: Able to verbalize current knowledge/experience and Progressing to goal      Endings: None Reported    Modes of Intervention: Support, Socialization, Activity and Media      Discipline Responsible: Psychoeducational Specialist      Signature:  CHAYA Fisher

## 2019-04-10 NOTE — BH NOTE
[facility]  Discharge Note    Pt discharged with followings belongings:   Dentures: None  Vision - Corrective Lenses: Glasses  Hearing Aid: None  Jewelry: Necklace, Watch  Body Piercings Removed: N/A  Clothing: Socks, Bathrobe, Undergarments (Comment), Pajamas, Footwear, Shirt  Were All Patient Medications Collected?: Yes  Other Valuables: Money (Comment), Mallory Kj, Cell phone, Keys($58 cash and 2 credit cards)   . Valuables retrieved from safe, and returned to patient. Patient left department with Departure Mode: In ambulance via [facility] at [address] [place] [address], discharged to Discharged to: Assisted living. Patient education on aftercare instructions: yes  Information faxed to [address] [place] [address] by W Patient verbalize understanding of AVS:  yes.     Status EXAM upon discharge:  Status and Exam  Normal: No  Facial Expression: Sad, Flat, Worried  Affect: Blunt  Level of Consciousness: Confused  Mood:Normal: No  Mood: Anxious, Suspicious, Sad  Motor Activity:Normal: Yes  Motor Activity: Increased  Interview Behavior: Cooperative  Preception: [place] to Person, Yvon Kerbs to Time, [place] to Place  Attention:Normal: No  Attention: Distractible  Thought Processes: Circumstantial  Thought Content:Normal: No  Thought Content: Paranoia, Preoccupations  Hallucinations: None  Delusions: Yes  Delusions: Persecution  Memory:Normal: No  Memory: Confabulation  Insight and Judgment: No  Insight and Judgment: Poor Judgment, Poor Insight, Unrealistic  Present Suicidal Ideation: No  Present Homicidal Ideation: No    Nancy Neal RN

## 2019-04-10 NOTE — PLAN OF CARE
Patient up on unit with steady gait noted. Denies SI/HI/AVH. Took medication as ordered. Denies pain or discomfort as well as further needs.  No falls or injury note this day

## 2020-08-20 ENCOUNTER — TELEPHONE (OUTPATIENT)
Dept: FAMILY MEDICINE CLINIC | Age: 85
End: 2020-08-20

## 2021-12-13 NOTE — PROGRESS NOTES
Pt continues to refuse vitamins in AM. States \"my doctor told me to stop taking all medications because they were messing with me\". Pt was educated on reason for taking meds. Pt still refused. Pt's legal guardian contacted staff about potential care facilities for discharge.  notified of call. Fluconazole Counseling:  Patient counseled regarding adverse effects of fluconazole including but not limited to headache, diarrhea, nausea, upset stomach, liver function test abnormalities, taste disturbance, and stomach pain.  There is a rare possibility of liver failure that can occur when taking fluconazole.  The patient understands that monitoring of LFTs and kidney function test may be required, especially at baseline. The patient verbalized understanding of the proper use and possible adverse effects of fluconazole.  All of the patient's questions and concerns were addressed.